# Patient Record
Sex: FEMALE | Race: WHITE | Employment: OTHER | URBAN - METROPOLITAN AREA
[De-identification: names, ages, dates, MRNs, and addresses within clinical notes are randomized per-mention and may not be internally consistent; named-entity substitution may affect disease eponyms.]

---

## 2017-01-10 DIAGNOSIS — E53.8 VITAMIN B12 DEFICIENCY: Primary | ICD-10-CM

## 2017-01-10 RX ORDER — PANTOPRAZOLE SODIUM 40 MG/1
40 TABLET, DELAYED RELEASE ORAL
COMMUNITY
Start: 2016-12-28 | End: 2018-01-10

## 2017-01-10 RX ORDER — CYANOCOBALAMIN 1000 UG/ML
1000 INJECTION INTRAMUSCULAR; SUBCUTANEOUS
Refills: 0 | COMMUNITY
Start: 2017-01-10 | End: 2018-07-27

## 2017-01-16 ENCOUNTER — NURSE ONLY (OUTPATIENT)
Dept: FAMILY MEDICINE CLINIC | Facility: CLINIC | Age: 67
End: 2017-01-16

## 2017-01-16 PROCEDURE — 96372 THER/PROPH/DIAG INJ SC/IM: CPT | Performed by: FAMILY MEDICINE

## 2017-01-16 RX ORDER — CYANOCOBALAMIN 1000 UG/ML
1000 INJECTION INTRAMUSCULAR; SUBCUTANEOUS ONCE
Status: COMPLETED | OUTPATIENT
Start: 2017-01-16 | End: 2017-01-16

## 2017-01-16 RX ADMIN — CYANOCOBALAMIN 1000 MCG: 1000 INJECTION INTRAMUSCULAR; SUBCUTANEOUS at 10:15:00

## 2017-01-17 ENCOUNTER — HOSPITAL ENCOUNTER (OUTPATIENT)
Dept: MRI IMAGING | Facility: HOSPITAL | Age: 67
Discharge: HOME | End: 2017-01-17
Attending: ORTHOPAEDIC SURGERY
Payer: MEDICARE

## 2017-01-17 DIAGNOSIS — M23.92 DERANGEMENT, KNEE INTERNAL, LEFT: ICD-10-CM

## 2017-01-17 PROCEDURE — 73721 MRI JNT OF LWR EXTRE W/O DYE: CPT

## 2017-01-24 PROBLEM — S83.242A ACUTE MEDIAL MENISCUS TEAR, LEFT, INITIAL ENCOUNTER: Status: ACTIVE | Noted: 2017-01-24

## 2017-01-24 PROBLEM — M17.12 PRIMARY OSTEOARTHRITIS OF LEFT KNEE: Status: ACTIVE | Noted: 2017-01-24

## 2017-01-27 ENCOUNTER — OFFICE VISIT (OUTPATIENT)
Dept: FAMILY MEDICINE CLINIC | Facility: CLINIC | Age: 67
End: 2017-01-27

## 2017-01-27 ENCOUNTER — APPOINTMENT (OUTPATIENT)
Dept: CT IMAGING | Facility: HOSPITAL | Age: 67
End: 2017-01-27
Attending: EMERGENCY MEDICINE
Payer: MEDICARE

## 2017-01-27 ENCOUNTER — HOSPITAL ENCOUNTER (EMERGENCY)
Facility: HOSPITAL | Age: 67
Discharge: HOME | End: 2017-01-27
Attending: EMERGENCY MEDICINE
Payer: MEDICARE

## 2017-01-27 VITALS
RESPIRATION RATE: 14 BRPM | DIASTOLIC BLOOD PRESSURE: 68 MMHG | HEART RATE: 76 BPM | BODY MASS INDEX: 28.56 KG/M2 | SYSTOLIC BLOOD PRESSURE: 130 MMHG | WEIGHT: 161.19 LBS | HEIGHT: 63 IN

## 2017-01-27 VITALS
DIASTOLIC BLOOD PRESSURE: 70 MMHG | BODY MASS INDEX: 27.29 KG/M2 | TEMPERATURE: 98 F | SYSTOLIC BLOOD PRESSURE: 148 MMHG | OXYGEN SATURATION: 98 % | HEART RATE: 62 BPM | RESPIRATION RATE: 18 BRPM | HEIGHT: 63 IN | WEIGHT: 154 LBS

## 2017-01-27 DIAGNOSIS — R40.20 LOC (LOSS OF CONSCIOUSNESS) (HCC): ICD-10-CM

## 2017-01-27 DIAGNOSIS — H53.9 VISION CHANGES: ICD-10-CM

## 2017-01-27 DIAGNOSIS — S00.83XA FACIAL BRUISING, INITIAL ENCOUNTER: ICD-10-CM

## 2017-01-27 DIAGNOSIS — S09.90XA HEAD INJURY, INITIAL ENCOUNTER: Primary | ICD-10-CM
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
## 2017-01-27 DIAGNOSIS — G44.311 INTRACTABLE ACUTE POST-TRAUMATIC HEADACHE: ICD-10-CM

## 2017-01-27 DIAGNOSIS — S00.83XA FACIAL CONTUSION, INITIAL ENCOUNTER: ICD-10-CM

## 2017-01-27 LAB
GLUCOSE BLD-MCNC: 61 MG/DL (ref 65–99)
GLUCOSE BLD-MCNC: 82 MG/DL (ref 65–99)

## 2017-01-27 PROCEDURE — 82962 GLUCOSE BLOOD TEST: CPT

## 2017-01-27 PROCEDURE — 99284 EMERGENCY DEPT VISIT MOD MDM: CPT

## 2017-01-27 PROCEDURE — 70450 CT HEAD/BRAIN W/O DYE: CPT

## 2017-01-27 PROCEDURE — 76377 3D RENDER W/INTRP POSTPROCES: CPT

## 2017-01-27 PROCEDURE — 70486 CT MAXILLOFACIAL W/O DYE: CPT

## 2017-01-27 RX ORDER — TRAMADOL HYDROCHLORIDE 50 MG/1
TABLET ORAL EVERY 4 HOURS PRN
Qty: 20 TABLET | Refills: 0 | Status: SHIPPED | OUTPATIENT
Start: 2017-01-27 | End: 2017-02-03

## 2017-01-27 RX ORDER — ERGOCALCIFEROL 1.25 MG/1
1 CAPSULE ORAL WEEKLY
Status: ON HOLD | COMMUNITY
Start: 2017-01-25 | End: 2017-02-28 | Stop reason: ALTCHOICE

## 2017-01-27 NOTE — ED PROVIDER NOTES
Patient Seen in: [facility] Emergency Department    History   Patient presents with:  Fall (musculoskeletal, neurologic)    Stated Complaint: fall/ head injury    HPI    71-year-old female complaining of head injury the patient tripped and fell 2 da sphincteroplasty     OTHER SURGICAL HISTORY  1996    Comment Exploratory laparotomy    OTHER SURGICAL HISTORY  1994    Comment partial gastrectomy/vagotomy    OTHER SURGICAL HISTORY  1996    Comment feeding tube    OTHER SURGICAL HISTORY      Comment bowel Take 1 tablet (300 mg total) by mouth once daily. Donepezil HCl 10 MG Oral Tab,  TAKE 1 TABLET BY MOUTH EVERY EVENING   lisinopril 10 MG Oral Tab,  Take 1 tablet (10 mg total) by mouth 2 (two) times daily.    TraMADol HCl 50 MG Oral Tab,  Take 1 tablet (5 other systems reviewed and negative except as noted above. PSFH elements reviewed from today and agreed except as otherwise stated in HPI.     Physical Exam       ED Triage Vitals   BP 01/27/17 1029 165/68 mmHg   Pulse 01/27/17 1029 70   Resp 01/27/17 10 Eun Solis, Mahendra [address] [phone]    In 3 days  As needed      Medications Prescribed:  Current Discharge Medication List

## 2017-01-27 NOTE — ED INITIAL ASSESSMENT (HPI)
Pt states on wed tripped while holding meat for the freezer pt states she did have loc she woke up on the floor  saw pcp today and was sent for CT per pt

## 2017-01-27 NOTE — PROGRESS NOTES
CC:  Patient presents with:  Fall: fell on grandson's toys on 1/25/17 - fell on left side- having headaches- blacked-out  Contusion (musculoskeletal)  Headache: taking Ibuprofen 600 mg BID- not helping      HPI: Abner Fraction presents with complaints of severe (8 - See telephone encounter on 08/12/2016 Disp:  Rfl: 0   Pilocarpine HCl 7.5 MG Oral Tab Take 1 tablet (7.5 mg total) by mouth 3 (three) times daily.  Disp: 90 tablet Rfl: 3   Blood Glucose Monitoring Suppl (TRUE METRIX AIR GLUCOSE METER) W/DEVICE Does not a Box Rfl: 3   DiltiaZEM HCl ER Beads (TAZTIA XT) 360 MG Oral Capsule SR 24 Hr TAKE 1 CAPSULE BY MOUTH ONCE DAILY Disp: 90 capsule Rfl: 1   mirtazapine 30 MG Oral Tab TAKE 1 TABLET BY MOUTH EVERY EVENING Disp: 90 tablet Rfl: 1   Atorvastatin Calcium 10 MG Or kg/(m^2). Vital signs reviewed.     Constitutional: Vital signs reviewed as noted; well developed, well nourished; in no acute distress  HENT:  Head: Bruising around the left eye with mild edema of the forehead and cheek area  Neurological: A&O x 3; norm

## 2017-01-30 ENCOUNTER — TELEPHONE (OUTPATIENT)
Dept: FAMILY MEDICINE CLINIC | Facility: CLINIC | Age: 67
End: 2017-01-30

## 2017-01-30 NOTE — TELEPHONE ENCOUNTER
Pt has made her appt for pre-Op w/Dr Stan Lacy on 1/31/17. She is having L Knee surgery w/Dr Lizz Ocasio on 2/10/17 (EKG and bld work requested. Shelly Master office to fax over orders)

## 2017-01-31 ENCOUNTER — APPOINTMENT (OUTPATIENT)
Dept: LAB | Age: 67
End: 2017-01-31
Attending: FAMILY MEDICINE
Payer: MEDICARE

## 2017-01-31 ENCOUNTER — OFFICE VISIT (OUTPATIENT)
Dept: FAMILY MEDICINE CLINIC | Facility: CLINIC | Age: 67
End: 2017-01-31

## 2017-01-31 VITALS
SYSTOLIC BLOOD PRESSURE: 136 MMHG | DIASTOLIC BLOOD PRESSURE: 60 MMHG | BODY MASS INDEX: 27.31 KG/M2 | WEIGHT: 160 LBS | RESPIRATION RATE: 16 BRPM | TEMPERATURE: 99 F | HEIGHT: 64 IN | HEART RATE: 60 BPM

## 2017-01-31 DIAGNOSIS — Z01.810 PREOP CARDIOVASCULAR EXAM: ICD-10-CM

## 2017-01-31 DIAGNOSIS — M35.00 SJOGREN'S SYNDROME (HCC): ICD-10-CM

## 2017-01-31 DIAGNOSIS — I10 ESSENTIAL HYPERTENSION: ICD-10-CM

## 2017-01-31 DIAGNOSIS — E78.00 PURE HYPERCHOLESTEROLEMIA: ICD-10-CM

## 2017-01-31 DIAGNOSIS — G47.00 INSOMNIA, UNSPECIFIED TYPE: ICD-10-CM

## 2017-01-31 DIAGNOSIS — E11.65 TYPE 2 DIABETES MELLITUS WITH HYPERGLYCEMIA, WITH LONG-TERM CURRENT USE OF INSULIN (HCC): ICD-10-CM

## 2017-01-31 DIAGNOSIS — Z01.818 PREOP TESTING: ICD-10-CM

## 2017-01-31 DIAGNOSIS — F03.90 DEMENTIA WITHOUT BEHAVIORAL DISTURBANCE, UNSPECIFIED DEMENTIA TYPE (HCC): ICD-10-CM

## 2017-01-31 DIAGNOSIS — Z79.4 TYPE 2 DIABETES MELLITUS WITH HYPERGLYCEMIA, WITH LONG-TERM CURRENT USE OF INSULIN (HCC): ICD-10-CM

## 2017-01-31 DIAGNOSIS — Z01.818 PREOP EXAMINATION: Primary | ICD-10-CM

## 2017-01-31 DIAGNOSIS — F31.9 BIPOLAR 1 DISORDER (HCC): ICD-10-CM

## 2017-01-31 DIAGNOSIS — L90.0 LICHEN SCLEROSUS: ICD-10-CM

## 2017-01-31 DIAGNOSIS — S83.242A ACUTE MEDIAL MENISCUS TEAR, LEFT, INITIAL ENCOUNTER: ICD-10-CM

## 2017-01-31 LAB
ALBUMIN SERPL-MCNC: 3.6 G/DL (ref 3.5–4.8)
ALP LIVER SERPL-CCNC: 133 U/L (ref 55–142)
ALT SERPL-CCNC: 48 U/L (ref 14–54)
AST SERPL-CCNC: 20 U/L (ref 15–41)
BILIRUB SERPL-MCNC: 0.2 MG/DL (ref 0.1–2)
BUN BLD-MCNC: 14 MG/DL (ref 8–20)
CALCIUM BLD-MCNC: 8.6 MG/DL (ref 8.3–10.3)
CHLORIDE: 106 MMOL/L (ref 101–111)
CO2: 25 MMOL/L (ref 22–32)
CREAT BLD-MCNC: 1.07 MG/DL (ref 0.55–1.02)
GLUCOSE BLD-MCNC: 299 MG/DL (ref 70–99)
M PROTEIN MFR SERPL ELPH: 6.9 G/DL (ref 6.1–8.3)
POTASSIUM SERPL-SCNC: 4.3 MMOL/L (ref 3.6–5.1)
SODIUM SERPL-SCNC: 136 MMOL/L (ref 136–144)

## 2017-01-31 PROCEDURE — 99214 OFFICE O/P EST MOD 30 MIN: CPT | Performed by: FAMILY MEDICINE

## 2017-01-31 PROCEDURE — 80053 COMPREHEN METABOLIC PANEL: CPT

## 2017-01-31 PROCEDURE — 36415 COLL VENOUS BLD VENIPUNCTURE: CPT

## 2017-01-31 PROCEDURE — 93000 ELECTROCARDIOGRAM COMPLETE: CPT | Performed by: FAMILY MEDICINE

## 2017-01-31 RX ORDER — CLOBETASOL PROPIONATE 0.5 MG/G
CREAM TOPICAL
Qty: 60 G | Refills: 3 | Status: SHIPPED | OUTPATIENT
Start: 2017-01-31

## 2017-01-31 NOTE — PROGRESS NOTES
Keila Muñoz is a 77year old female who presents for a pre-operative physical exam. Patient is to have left meniscal surgery, to be done by Dr. Bull Khoury at [facility] on 2/10/17. Pt has had previous anesthesia:  Yes.   Previous complicati ( mg total) by mouth every 4 (four) hours as needed for Pain.  Disp: 20 tablet Rfl: 0   Pantoprazole Sodium 40 MG Oral Tab EC  Disp:  Rfl:    cyanocobalamin 1000 MCG/ML Injection Solution Inject 1 mL (1,000 mcg total) into the muscle every 30 (thirty) Pen Needle (BD PEN NEEDLE MINI U/F) 31G X 5 MM Does not apply Misc Inject 1 pen into the skin daily.  Disp: 100 Box Rfl: 3   DiltiaZEM HCl ER Beads (TAZTIA XT) 360 MG Oral Capsule SR 24 Hr TAKE 1 CAPSULE BY MOUTH ONCE DAILY Disp: 90 capsule Rfl: 1   mirtaza Diverticulosis      uncomplicated   • Fecal incontinence 3/2012   • Urogenital disorder      atrophy   • Lichen sclerosus    • Asthma    • AR (allergic rhinitis) 3/5/2013   • Urticaria      trunk, extremities, face   • Headache in front of head 2/27/2013 headaches    EXAM:   /60 mmHg  Pulse 60  Temp(Src) 98.5 °F (36.9 °C) (Oral)  Resp 16  Ht 64\"  Wt 160 lb  BMI 27.45 kg/m2  GENERAL: well developed, well nourished,in no apparent distress  HEENT: atraumatic, normocephalic, O/P clear  NECK: supple,no a

## 2017-02-14 RX ORDER — ONDANSETRON 4 MG/1
TABLET, FILM COATED ORAL
Qty: 30 TABLET | Refills: 0 | Status: SHIPPED | OUTPATIENT
Start: 2017-02-14 | End: 2017-02-21

## 2017-02-14 NOTE — TELEPHONE ENCOUNTER
LOV 1/12/17.  Future Appointments  Date Time Provider Elvin Osman   2/17/2017 9:00 AM [facility]   2/20/2017 9:30 AM [facility] 03 NURSE [facility] 3 [facility]   3/16/2017 10:00 AM Rubia Barrera MD [facility] 3 [facility]   3/22/201

## 2017-02-17 PROBLEM — Z98.890 S/P LEFT KNEE ARTHROSCOPY: Status: ACTIVE | Noted: 2017-02-17

## 2017-02-21 ENCOUNTER — TELEPHONE (OUTPATIENT)
Dept: FAMILY MEDICINE CLINIC | Facility: CLINIC | Age: 67
End: 2017-02-21

## 2017-02-21 RX ORDER — ONDANSETRON 4 MG/1
4 TABLET, FILM COATED ORAL DAILY PRN
Qty: 90 TABLET | Refills: 0 | Status: SHIPPED | OUTPATIENT
Start: 2017-02-21

## 2017-02-21 RX ORDER — ONDANSETRON 4 MG/1
4 TABLET, FILM COATED ORAL DAILY PRN
Qty: 90 TABLET | Refills: 0 | Status: SHIPPED | OUTPATIENT
Start: 2017-02-21 | End: 2017-02-21

## 2017-02-21 NOTE — TELEPHONE ENCOUNTER
Medication sent to mail order. However, local pharmacy was cued up so that is where the prescription went first.  Please call local pharmacy and cancel that prescription.

## 2017-02-21 NOTE — TELEPHONE ENCOUNTER
Human mail order sent fax asking that Zofran script be revised and resent. The Sig did not say how many times Pt can take daily, and the quantity would have to be 90 day supply. Script changed and pended for approval. Is this order ok ?   Routed to Dr Darline Ott

## 2017-02-22 ENCOUNTER — NURSE ONLY (OUTPATIENT)
Dept: FAMILY MEDICINE CLINIC | Facility: CLINIC | Age: 67
End: 2017-02-22

## 2017-02-22 PROCEDURE — 96372 THER/PROPH/DIAG INJ SC/IM: CPT | Performed by: FAMILY MEDICINE

## 2017-02-22 RX ORDER — CYANOCOBALAMIN 1000 UG/ML
1000 INJECTION INTRAMUSCULAR; SUBCUTANEOUS ONCE
Status: COMPLETED | OUTPATIENT
Start: 2017-02-22 | End: 2017-02-22

## 2017-02-22 RX ADMIN — CYANOCOBALAMIN 1000 MCG: 1000 INJECTION INTRAMUSCULAR; SUBCUTANEOUS at 08:37:00

## 2017-02-22 NOTE — PROGRESS NOTES
Pt came in for monthly b12 injections. Pt was given 1000mcg in left arm IM. Pt responded well to injection, no reaction. Pt was told to schedule nurse visit in one month for next injection. Pt understood and verbalized agreement.

## 2017-02-27 ENCOUNTER — HOSPITAL ENCOUNTER (INPATIENT)
Facility: HOSPITAL | Age: 67
LOS: 2 days | Discharge: HOME | DRG: 390 | End: 2017-03-02
Attending: EMERGENCY MEDICINE | Admitting: HOSPITALIST
Payer: MEDICARE

## 2017-02-27 ENCOUNTER — APPOINTMENT (OUTPATIENT)
Dept: GENERAL RADIOLOGY | Facility: HOSPITAL | Age: 67
DRG: 390 | End: 2017-02-27
Attending: EMERGENCY MEDICINE
Payer: MEDICARE

## 2017-02-27 ENCOUNTER — APPOINTMENT (OUTPATIENT)
Dept: CT IMAGING | Facility: HOSPITAL | Age: 67
DRG: 390 | End: 2017-02-27
Attending: EMERGENCY MEDICINE
Payer: MEDICARE

## 2017-02-27 DIAGNOSIS — K56.609 SBO (SMALL BOWEL OBSTRUCTION) (HCC): Primary | ICD-10-CM

## 2017-02-27 LAB
ALBUMIN SERPL-MCNC: 3.3 G/DL (ref 3.5–4.8)
ALP LIVER SERPL-CCNC: 160 U/L (ref 55–142)
ALT SERPL-CCNC: 30 U/L (ref 14–54)
AST SERPL-CCNC: 15 U/L (ref 15–41)
BASOPHILS # BLD AUTO: 0.05 X10(3) UL (ref 0–0.1)
BASOPHILS NFR BLD AUTO: 0.5 %
BILIRUB SERPL-MCNC: 0.2 MG/DL (ref 0.1–2)
BILIRUB UR QL STRIP.AUTO: NEGATIVE
BUN BLD-MCNC: 11 MG/DL (ref 8–20)
CALCIUM BLD-MCNC: 9.1 MG/DL (ref 8.3–10.3)
CHLORIDE: 106 MMOL/L (ref 101–111)
CLARITY UR REFRACT.AUTO: CLEAR
CO2: 25 MMOL/L (ref 22–32)
COLOR UR AUTO: YELLOW
CREAT BLD-MCNC: 0.96 MG/DL (ref 0.55–1.02)
EOSINOPHIL # BLD AUTO: 0.31 X10(3) UL (ref 0–0.3)
EOSINOPHIL NFR BLD AUTO: 3.2 %
ERYTHROCYTE [DISTWIDTH] IN BLOOD BY AUTOMATED COUNT: 15.6 % (ref 11.5–16)
GLUCOSE BLD-MCNC: 295 MG/DL (ref 70–99)
GLUCOSE UR STRIP.AUTO-MCNC: >=500 MG/DL
HCT VFR BLD AUTO: 37.9 % (ref 34–50)
HGB BLD-MCNC: 12.7 G/DL (ref 12–16)
HYALINE CASTS #/AREA URNS AUTO: PRESENT /LPF
IMMATURE GRANULOCYTE COUNT: 0.03 X10(3) UL (ref 0–1)
IMMATURE GRANULOCYTE RATIO %: 0.3 %
KETONES UR STRIP.AUTO-MCNC: NEGATIVE MG/DL
LIPASE: 148 U/L (ref 73–393)
LYMPHOCYTES # BLD AUTO: 2.67 X10(3) UL (ref 0.9–4)
LYMPHOCYTES NFR BLD AUTO: 27.6 %
M PROTEIN MFR SERPL ELPH: 6.9 G/DL (ref 6.1–8.3)
MCH RBC QN AUTO: 27.1 PG (ref 27–33.2)
MCHC RBC AUTO-ENTMCNC: 33.5 G/DL (ref 31–37)
MCV RBC AUTO: 80.8 FL (ref 81–100)
MONOCYTES # BLD AUTO: 0.66 X10(3) UL (ref 0.1–0.6)
MONOCYTES NFR BLD AUTO: 6.8 %
NEUTROPHIL ABS PRELIM: 5.94 X10 (3) UL (ref 1.3–6.7)
NEUTROPHILS # BLD AUTO: 5.94 X10(3) UL (ref 1.3–6.7)
NEUTROPHILS NFR BLD AUTO: 61.6 %
NITRITE UR QL STRIP.AUTO: NEGATIVE
PH UR STRIP.AUTO: 5 [PH] (ref 4.5–8)
PLATELET # BLD AUTO: 310 10(3)UL (ref 150–450)
POTASSIUM SERPL-SCNC: 4.1 MMOL/L (ref 3.6–5.1)
PROT UR STRIP.AUTO-MCNC: NEGATIVE MG/DL
RBC # BLD AUTO: 4.69 X10(6)UL (ref 3.8–5.1)
RBC UR QL AUTO: NEGATIVE
RED CELL DISTRIBUTION WIDTH-SD: 44.6 FL (ref 35.1–46.3)
SODIUM SERPL-SCNC: 142 MMOL/L (ref 136–144)
SP GR UR STRIP.AUTO: 1.02 (ref 1–1.03)
UROBILINOGEN UR STRIP.AUTO-MCNC: <2 MG/DL
WBC # BLD AUTO: 9.7 X10(3) UL (ref 4–13)

## 2017-02-27 PROCEDURE — 71010 XR CHEST AP PORTABLE  (CPT=71010): CPT

## 2017-02-27 PROCEDURE — 74177 CT ABD & PELVIS W/CONTRAST: CPT

## 2017-02-27 RX ORDER — MAGNESIUM HYDROXIDE/ALUMINUM HYDROXICE/SIMETHICONE 120; 1200; 1200 MG/30ML; MG/30ML; MG/30ML
30 SUSPENSION ORAL ONCE
Status: COMPLETED | OUTPATIENT
Start: 2017-02-27 | End: 2017-02-27

## 2017-02-27 RX ORDER — ONDANSETRON 4 MG/1
TABLET, ORALLY DISINTEGRATING ORAL
Status: COMPLETED
Start: 2017-02-27 | End: 2017-02-27

## 2017-02-27 RX ORDER — ONDANSETRON 4 MG/1
4 TABLET, ORALLY DISINTEGRATING ORAL ONCE
Status: COMPLETED | OUTPATIENT
Start: 2017-02-27 | End: 2017-02-27

## 2017-02-28 PROBLEM — K56.609 SBO (SMALL BOWEL OBSTRUCTION) (HCC): Status: ACTIVE | Noted: 2017-02-28

## 2017-02-28 LAB
ATRIAL RATE: 83 BPM
EST. AVERAGE GLUCOSE BLD GHB EST-MCNC: 197 MG/DL (ref 68–126)
GLUCOSE BLD-MCNC: 117 MG/DL (ref 65–99)
GLUCOSE BLD-MCNC: 128 MG/DL (ref 65–99)
GLUCOSE BLD-MCNC: 189 MG/DL (ref 65–99)
GLUCOSE BLD-MCNC: 78 MG/DL (ref 65–99)
GLUCOSE BLD-MCNC: 99 MG/DL (ref 65–99)
HBA1C MFR BLD HPLC: 8.5 % (ref ?–5.7)
P AXIS: 65 DEGREES
P-R INTERVAL: 158 MS
Q-T INTERVAL: 386 MS
QRS DURATION: 88 MS
QTC CALCULATION (BEZET): 453 MS
R AXIS: -46 DEGREES
T AXIS: 25 DEGREES
VENTRICULAR RATE: 83 BPM

## 2017-02-28 PROCEDURE — 99223 1ST HOSP IP/OBS HIGH 75: CPT | Performed by: HOSPITALIST

## 2017-02-28 PROCEDURE — 99232 SBSQ HOSP IP/OBS MODERATE 35: CPT | Performed by: INTERNAL MEDICINE

## 2017-02-28 RX ORDER — ASCORBIC ACID 500 MG
500 TABLET ORAL DAILY
COMMUNITY

## 2017-02-28 RX ORDER — MORPHINE SULFATE 4 MG/ML
4 INJECTION, SOLUTION INTRAMUSCULAR; INTRAVENOUS EVERY 2 HOUR PRN
Status: DISCONTINUED | OUTPATIENT
Start: 2017-02-28 | End: 2017-03-02

## 2017-02-28 RX ORDER — SODIUM CHLORIDE 9 MG/ML
INJECTION, SOLUTION INTRAVENOUS CONTINUOUS
Status: ACTIVE | OUTPATIENT
Start: 2017-02-28 | End: 2017-02-28

## 2017-02-28 RX ORDER — ONDANSETRON 2 MG/ML
4 INJECTION INTRAMUSCULAR; INTRAVENOUS EVERY 6 HOURS PRN
Status: DISCONTINUED | OUTPATIENT
Start: 2017-02-28 | End: 2017-03-02

## 2017-02-28 RX ORDER — HYDROMORPHONE HYDROCHLORIDE 1 MG/ML
0.5 INJECTION, SOLUTION INTRAMUSCULAR; INTRAVENOUS; SUBCUTANEOUS ONCE
Status: COMPLETED | OUTPATIENT
Start: 2017-02-28 | End: 2017-02-28

## 2017-02-28 RX ORDER — MORPHINE SULFATE 4 MG/ML
INJECTION, SOLUTION INTRAMUSCULAR; INTRAVENOUS
Status: COMPLETED
Start: 2017-02-28 | End: 2017-02-28

## 2017-02-28 RX ORDER — SODIUM CHLORIDE 9 MG/ML
1000 INJECTION, SOLUTION INTRAVENOUS ONCE
Status: COMPLETED | OUTPATIENT
Start: 2017-02-28 | End: 2017-02-28

## 2017-02-28 RX ORDER — ENOXAPARIN SODIUM 100 MG/ML
40 INJECTION SUBCUTANEOUS DAILY
Status: DISCONTINUED | OUTPATIENT
Start: 2017-02-28 | End: 2017-03-02

## 2017-02-28 RX ORDER — DEXTROSE MONOHYDRATE 25 G/50ML
50 INJECTION, SOLUTION INTRAVENOUS
Status: DISCONTINUED | OUTPATIENT
Start: 2017-02-28 | End: 2017-03-02

## 2017-02-28 RX ORDER — DEXTROSE AND SODIUM CHLORIDE 5; .9 G/100ML; G/100ML
INJECTION, SOLUTION INTRAVENOUS CONTINUOUS
Status: DISCONTINUED | OUTPATIENT
Start: 2017-02-28 | End: 2017-03-02

## 2017-02-28 RX ORDER — DIPHENHYDRAMINE HYDROCHLORIDE 50 MG/ML
25 INJECTION INTRAMUSCULAR; INTRAVENOUS 2 TIMES DAILY PRN
Status: DISCONTINUED | OUTPATIENT
Start: 2017-02-28 | End: 2017-03-02

## 2017-02-28 RX ORDER — SODIUM CHLORIDE 9 MG/ML
INJECTION, SOLUTION INTRAVENOUS CONTINUOUS
Status: DISCONTINUED | OUTPATIENT
Start: 2017-02-28 | End: 2017-02-28

## 2017-02-28 RX ORDER — MORPHINE SULFATE 2 MG/ML
2 INJECTION, SOLUTION INTRAMUSCULAR; INTRAVENOUS EVERY 2 HOUR PRN
Status: DISCONTINUED | OUTPATIENT
Start: 2017-02-28 | End: 2017-03-02

## 2017-02-28 RX ORDER — MORPHINE SULFATE 2 MG/ML
1 INJECTION, SOLUTION INTRAMUSCULAR; INTRAVENOUS EVERY 2 HOUR PRN
Status: DISCONTINUED | OUTPATIENT
Start: 2017-02-28 | End: 2017-03-02

## 2017-02-28 NOTE — PROGRESS NOTES
Pt is A/O x 4. Room air, lungs clear. Bowel sounds active, most tender in upper epigastric region. Pt belching frequently. No BM this AM. Pain medication 1x on floor Morphine 4 mg which pt reports has resolved her pain.  Pruritis and restlessness- PRN IV Be

## 2017-02-28 NOTE — PROGRESS NOTES
02/28/17 0947   Clinical Encounter Type   Visited With Patient   Routine Visit Introduction   Continue Visiting Yes   Mandaen Encounters   Mandaen Needs Prayer    introduced self to the patient. Listened actively to the patient's journey.  En

## 2017-02-28 NOTE — ED PROVIDER NOTES
Patient Seen in: [facility] Emergency Department    History   Patient presents with:  Abdomen/Flank Pain (GI/)    Stated Complaint: abd pain    HPI    30-year-old female presents emergency room with chief complaint of abdominal pain.   Patient stat sphincteroplasty     OTHER SURGICAL HISTORY  1996    Comment Exploratory laparotomy    OTHER SURGICAL HISTORY  1994    Comment partial gastrectomy/vagotomy    OTHER SURGICAL HISTORY  1996    Comment feeding tube    OTHER SURGICAL HISTORY      Comment bowel mg total) by mouth once daily. BuPROPion HCl ER, XL, 300 MG Oral Tablet 24 Hr,  Take 1 tablet (300 mg total) by mouth once daily.    Donepezil HCl 10 MG Oral Tab,  TAKE 1 TABLET BY MOUTH EVERY EVENING   lisinopril 10 MG Oral Tab,  Take 1 tablet (10 mg tot Vitals   BP 02/27/17 2127 170/84 mmHg   Pulse 02/27/17 2127 91   Resp 02/27/17 2127 18   Temp 02/27/17 2127 97.9 °F (36.6 °C)   Temp src 02/27/17 2127 Temporal   SpO2 02/27/17 2127 98 %   O2 Device 02/27/17 2127 None (Room air)       Current:/65 mmHg ---------                               -----------         ------                     CBC W/ DIFFERENTIAL[[id number]]          Abnormal            Final result                 Please view results for these tests on the individual orders.

## 2017-02-28 NOTE — CONSULTS
[facility]  Report of Consultation    Nikolucio Johnson Patient Status:  Inpatient    1950 MRN [id number]   [facility] 3NE-A Attending Morenita Kellogg MD   [address] Day # 1 PCP Mabel Costa MD     Reason for Consultation:  Abdominal p obstruction [facility])    • Diverticulosis      uncomplicated   • Fecal incontinence 3/2012   • Urogenital disorder      atrophy   • Lichen sclerosus    • Asthma    • AR (allergic rhinitis) 3/5/2013   • Urticaria      trunk, extremities, face   • Headache in fr Intolerance       Metformin                   Comment:Abdominal cramp  Msg [Monosodium Glu*      Other                   Diarrhea    Comment:Raw sugar  Tylenol [Acetaminop*        Comment:Elevated liver enzymes    Medications:    Current facility-administe discharge and vision loss  Gastrointestinal: Positive for abdominal pain, nausea  Genitourinary:  Negative for dysuria and hematuria  Hema/Lymph:  Negative for easy bleeding and easy bruising  Integumentary:  Negative for pruritus and rash  Musculoskeletal 142   K  4.1   CL  106   CO2  25.0   ALKPHO  160*   AST  15   ALT  30   BILT  0.2   TP  6.9         No results for input(s): PTP, INR, PTT in the last 72 hours.     Imaging: (Dr. Joanie Orellana reviewed the report.)  Study Result      PROCEDURE: CT ABDOMEN PELVIS I calcifications. BONES:  No acute fractures.          =====  CONCLUSION:     #1. Distended loops of small bowel the small bowel fecalith spine. No definite transition point is identified.  The findings may represent a partial small bowel obstruction versus at this time, may resume p.o. medications once tolerating a diet  4.  I discussed with the patient and her companions that if she feels to improve with medical management she may require surgical intervention to relieve her small bowel obstruction, she does

## 2017-02-28 NOTE — PROGRESS NOTES
NURSING ADMISSION NOTE      Patient admitted via Cart  Oriented to room. Safety precautions initiated. Bed in low position. Call light in reach. A/O x 4. Completed admission navigator with patient. Dr Kelsea Rodriguez at bedside.  Surgery notified of consul

## 2017-02-28 NOTE — CM/SW NOTE
SW found pt thru casefinding for readmission risk and met with pt for assessment and d/c planning. Pt is a 79 y.o female admitted on 02/27 with SBO. Pt has no recent admissions per chart review.  Pt's medical history includes Bipolar, cervical CA, depressi

## 2017-02-28 NOTE — PLAN OF CARE
Diabetes/Glucose Control    • Glucose maintained within prescribed range Progressing        GASTROINTESTINAL - ADULT    • Minimal or absence of nausea and vomiting Progressing        PAIN - ADULT    • Verbalizes/displays adequate comfort level or patient's

## 2017-02-28 NOTE — PAYOR COMM NOTE
Attending Physician: Cleveland Sprague MD    Review Type: ADMISSION   Reviewer: Koko Puckett       Date: February 28, 2017 - 8:19 AM  Payor: Silvana Fletcher MEDICARE ADV HMO  Authorization Number: [id number]  Admit date: 2/27/2017  9:39 PM       REVIEWER COMMENTS  Chief C mg Intravenous Jordy Sequeira RN      ondansetron (ZOFRAN-ODT) disintegrating tab 4 mg     Date Action Dose Route User    2/27/2017 2142 Given 4 mg Oral Eileen Teague RN      0.9%  NaCl infusion 100 cc/hr    Date Action Dose Route User    2/28/2017 00 since yesterday. She has had no bowel movements. She has intermittent nausea.

## 2017-02-28 NOTE — PROGRESS NOTES
[facility]  Progress Note     Serena Mojica Patient Status:  Inpatient    1950 MRN [id number]   [facility] 3NE-A Attending Thony South MD   [facility] Day # 1 PCP Michael Conte MD     Chief Complaint: nausea    S: Patient repo adhesions from prior surgery. 2. Passing gas  3. Keep NPO  4. IVF, antiemetics, pain control  5. Surgery consult  2. DMII, 8.5%  1. hyperglycemia protocol  3. HTN, controlled  4. DL, statin on hold  5. GERD  6.  Depression/bipolar/insomnia, meds on hold fo

## 2017-02-28 NOTE — H&P
[facility]  History and Physical     Radha Louis Patient Status:  Inpatient    1950 MRN [id number]   [facility] 3NE-A Attending Nay Frank MD   Hosp Day # 1 PCP Matthew Jacobson MD     Chief Complaint: Abdominal pain (postoperative nausea and vomiting)    • Bipolar affective (HCC)    • Visual impairment      glasses        Past Surgical History:     Past Surgical History    KNEE REPLACEMENT SURGERY      APPENDECTOMY  1996    CHOLECYSTECTOMY      OTHER      Comment anal Rfl:    cyanocobalamin 1000 MCG/ML Injection Solution Inject 1 mL (1,000 mcg total) into the muscle every 30 (thirty) days.  Per Dr. Zonia Costa - See telephone encounter on 08/12/2016 Disp:  Rfl: 0   glimepiride 2 MG Oral Tab Take 1 tablet (2 mg total) by mouth each Rfl: 3   Cevimeline HCl 30 MG Oral Cap Take 1 capsule (30 mg total) by mouth 3 (three) times daily.  Failed pilocarpine 7.5mg TID Disp: 270 capsule Rfl: 3   Glucose Blood (TRUE METRIX BLOOD GLUCOSE TEST) In Vitro Strip TEST ONCE DAILY AS DIRECTED Disp: 9. 1   ALB  3.3*   NA  142   K  4.1   CL  106   CO2  25.0   ALKPHO  160*   AST  15   ALT  30   BILT  0.2   TP  6.9       Estimated Creatinine Clearance: 47 mL/min (based on Cr of 0.96). No results for input(s): PTP, INR in the last 72 hours.     No result

## 2017-03-01 ENCOUNTER — APPOINTMENT (OUTPATIENT)
Dept: GENERAL RADIOLOGY | Facility: HOSPITAL | Age: 67
DRG: 390 | End: 2017-03-01
Attending: HOSPITALIST
Payer: MEDICARE

## 2017-03-01 LAB
GLUCOSE BLD-MCNC: 126 MG/DL (ref 65–99)
GLUCOSE BLD-MCNC: 133 MG/DL (ref 65–99)
GLUCOSE BLD-MCNC: 147 MG/DL (ref 65–99)
GLUCOSE BLD-MCNC: 155 MG/DL (ref 65–99)
GLUCOSE BLD-MCNC: 176 MG/DL (ref 65–99)

## 2017-03-01 PROCEDURE — 99232 SBSQ HOSP IP/OBS MODERATE 35: CPT | Performed by: HOSPITALIST

## 2017-03-01 PROCEDURE — 74020 XR ABDOMEN, OBSTRUCTIVE SERIES (CPT=74020): CPT

## 2017-03-01 RX ORDER — LISINOPRIL 10 MG/1
10 TABLET ORAL 2 TIMES DAILY
Status: DISCONTINUED | OUTPATIENT
Start: 2017-03-01 | End: 2017-03-02

## 2017-03-01 RX ORDER — ESCITALOPRAM OXALATE 20 MG/1
20 TABLET ORAL NIGHTLY
Status: DISCONTINUED | OUTPATIENT
Start: 2017-03-01 | End: 2017-03-02

## 2017-03-01 RX ORDER — MIRTAZAPINE 15 MG/1
30 TABLET, FILM COATED ORAL NIGHTLY
Status: DISCONTINUED | OUTPATIENT
Start: 2017-03-01 | End: 2017-03-02

## 2017-03-01 NOTE — PROGRESS NOTES
03/01/17 1615   Clinical Encounter Type   Visited With Patient   Continue Visiting No   Patient's Supportive Strategies/Resources ( provided follow up visit.  Patient was at peace and encouraged, treatment having been successful, and expecting di

## 2017-03-01 NOTE — DISCHARGE SUMMARY
[facility]  DISCHARGE SUMMARY     Terence Johnson Patient Status:  Inpatient    1950 MRN [id number]   [facility] 3NE-A Attending Amanda Macdonald, [address] [facility] Day # 3 PCP Ronen Mendoza MD     Date of Admission: 2017  Date of Imaging c/w partial SOB vs. Ileus. She was admitted for further w/u and general surgery was consulted. She continued on conservative mgmt with antiemetics, IVF, pain mgmt. She did not require NG tube.   She slowly improved and diet was advanced as tolera tablet   Refills:  1       Insulin Glargine 100 UNIT/ML Sopn   Commonly known as:  LANTUS SOLOSTAR        Inject 10-30 Units into the skin nightly. As directed.     Quantity:  3 pen   Refills:  3       lisinopril 10 MG Tabs   Last time this was given:  10 m possible for a visit in 2 weeks      Jennifer Oden MD  [address] [facility] [phone]    In 1 week      Future Appointments  Date Time Provider Elvin Osman   3/7/2017 11:30 AM Jennifer Oden MD [facility] 3 [facility]   3/2

## 2017-03-01 NOTE — PLAN OF CARE
Diabetes/Glucose Control    • Glucose maintained within prescribed range Progressing        GASTROINTESTINAL - ADULT    • Minimal or absence of nausea and vomiting Progressing    • Maintains or returns to baseline bowel function Progressing    • Maintains

## 2017-03-01 NOTE — PROGRESS NOTES
[facility]  Progress Note    Demond Holding Patient Status:  Inpatient    1950 MRN [id number]   [facility] 3NE-A Attending Stephany Martinez, [address] [facility] Day # 2 PCP Rodri Joseph MD     Subjective:  No new complaints.  Awake and son Acute medial meniscus tear, left, initial encounter          Global exp 5-11-17 / LEFT KNEE / Clayton Carlisle      Pure hypercholesterolemia     Essential hypertension     S/P left knee arthroscopy     SBO (small bowel obstruction) (HCC)    Partial small bowel obstruc

## 2017-03-01 NOTE — PROGRESS NOTES
[facility]  Progress Note     [facility] Patient Status:  Inpatient    1950 MRN [id number]   [facility] 3NE-A Attending Genny Akbar, [address] [facility] Day # 2 PCP Megan Estrada MD     Chief Complaint: nausea    S: Patient repo gas/small BM  3. Diet -advance to soft diet when ok with surgery. 4. Reduce IVF, antiemetics, pain control  2. DMII, 8.5% uncontrolled  1. hyperglycemia protocol  3. HTN, controlled  4. DL, statin on hold  5. GERD  6.  Depression/bipolar/insomnia  1. meds

## 2017-03-01 NOTE — PLAN OF CARE
Patient reports she slept \"ok\". She is calm and cooperative with care. VSS. O2 sats wnl on RA. She states she had to get up for xray and everytime she gets up, she becomes nauseous. Zofran given for mild nausea.   She reports she had a small bm this a

## 2017-03-02 VITALS
HEIGHT: 63 IN | RESPIRATION RATE: 18 BRPM | HEART RATE: 70 BPM | DIASTOLIC BLOOD PRESSURE: 63 MMHG | OXYGEN SATURATION: 97 % | WEIGHT: 157 LBS | TEMPERATURE: 98 F | SYSTOLIC BLOOD PRESSURE: 139 MMHG | BODY MASS INDEX: 27.82 KG/M2

## 2017-03-02 LAB
GLUCOSE BLD-MCNC: 143 MG/DL (ref 65–99)
GLUCOSE BLD-MCNC: 88 MG/DL (ref 65–99)

## 2017-03-02 PROCEDURE — 99239 HOSP IP/OBS DSCHRG MGMT >30: CPT | Performed by: HOSPITALIST

## 2017-03-02 NOTE — PROGRESS NOTES
[facility]  Progress Note     Eliseo Henriquez Patient Status:  Inpatient    1950 MRN [id number]   [facility] 3NE-A Attending Jamar Covington, [address] [facility] Day # 3 PCP Lianet Aguila MD     Chief Complaint: nausea    S: Patient repo abdominal distention, resolved  1. Suspect adhesions from prior surgery. 2. Passing stool, no further nausea and distention resolved  3. Advance diet. 4. stop IVF, antiemetics, pain control  2. DMII, 8.5% uncontrolled  1. hyperglycemia protocol  3.  HTN,

## 2017-03-02 NOTE — PROGRESS NOTES
[facility]  Progress Note    Willy Riser Patient Status:  Inpatient    1950 MRN [id number]   [facility] 3NE-A Attending Clint Zepeda, [address] [facility] Day # 3 PCP Tara Chicas MD     Subjective:  No new complaints.  Awake and son meniscus tear, left, initial encounter          Global exp 5-11-17 / LEFT KNEE / Sangeetha Ray      Pure hypercholesterolemia     Essential hypertension     S/P left knee arthroscopy     SBO (small bowel obstruction) (HCC)    Partial small bowel obstruction, resolve

## 2017-03-02 NOTE — CM/SW NOTE
03/02/17 1400   Discharge disposition   Discharged to: Home or [address] [place] [address] services after discharge Patient refused services   Discharge transportation Private car

## 2017-03-07 ENCOUNTER — OFFICE VISIT (OUTPATIENT)
Dept: FAMILY MEDICINE CLINIC | Facility: CLINIC | Age: 67
End: 2017-03-07

## 2017-03-07 VITALS
SYSTOLIC BLOOD PRESSURE: 136 MMHG | DIASTOLIC BLOOD PRESSURE: 66 MMHG | TEMPERATURE: 98 F | BODY MASS INDEX: 28 KG/M2 | HEART RATE: 84 BPM | WEIGHT: 158 LBS | RESPIRATION RATE: 16 BRPM

## 2017-03-07 DIAGNOSIS — R82.90 ABNORMAL URINALYSIS: ICD-10-CM

## 2017-03-07 DIAGNOSIS — E11.65 TYPE 2 DIABETES MELLITUS WITH HYPERGLYCEMIA, WITH LONG-TERM CURRENT USE OF INSULIN (HCC): ICD-10-CM

## 2017-03-07 DIAGNOSIS — K56.609 SBO (SMALL BOWEL OBSTRUCTION) (HCC): Primary | ICD-10-CM

## 2017-03-07 DIAGNOSIS — E78.00 PURE HYPERCHOLESTEROLEMIA: ICD-10-CM

## 2017-03-07 DIAGNOSIS — Z79.4 TYPE 2 DIABETES MELLITUS WITH HYPERGLYCEMIA, WITH LONG-TERM CURRENT USE OF INSULIN (HCC): ICD-10-CM

## 2017-03-07 LAB
BILIRUBIN: NEGATIVE
GLUCOSE (URINE DIPSTICK): NEGATIVE MG/DL
KETONES (URINE DIPSTICK): NEGATIVE MG/DL
LEUKOCYTES: NEGATIVE
MULTISTIX LOT#: ABNORMAL NUMERIC
NITRITE, URINE: NEGATIVE
OCCULT BLOOD: NEGATIVE
PH, URINE: 5.5 (ref 4.5–8)
SPECIFIC GRAVITY: 1.03 (ref 1–1.03)

## 2017-03-07 PROCEDURE — 87086 URINE CULTURE/COLONY COUNT: CPT | Performed by: FAMILY MEDICINE

## 2017-03-07 PROCEDURE — 81003 URINALYSIS AUTO W/O SCOPE: CPT | Performed by: FAMILY MEDICINE

## 2017-03-07 PROCEDURE — 99214 OFFICE O/P EST MOD 30 MIN: CPT | Performed by: FAMILY MEDICINE

## 2017-03-08 NOTE — PROGRESS NOTES
HPI:    Yael Douglas is a 79year old female here today for hospital follow up.    She was discharged from Inpatient hospital, [facility] to Home   Admit Date: 2/27/17  Discharge Date: 3/2/17  Hospital Discharge Diagnosis:    SBO    HPI: admitted Donepezil HCl 10 MG Oral Tab TAKE 1 TABLET BY MOUTH EVERY EVENING   lisinopril 10 MG Oral Tab Take 1 tablet (10 mg total) by mouth 2 (two) times daily.    DiltiaZEM HCl ER Beads (TAZTIA XT) 360 MG Oral Capsule SR 24 Hr TAKE 1 CAPSULE BY MOUTH ONCE DAILY history includes CAD in her mother; CAD,HTN in her father; Cancer in her father and another family member; Diabetes in her brother; Heart Disease in her mother; Heart Disorder (age of onset: 80) in her mother; Stroke in her mother.    She  reports that she check.  A1c prior to visit. 4.  Hypercholesterolemia  Lipids due before next visit.     F/u in ~8 weeks    Orders Placed This Encounter  Urine Dip, auto without Micro  Urine Culture, Routine [E]    Meds & Refills for this Visit:  No prescriptions request

## 2017-03-13 ENCOUNTER — TELEPHONE (OUTPATIENT)
Dept: FAMILY MEDICINE CLINIC | Facility: CLINIC | Age: 67
End: 2017-03-13

## 2017-03-16 RX ORDER — GLIMEPIRIDE 2 MG/1
TABLET ORAL
Qty: 180 TABLET | Refills: 1 | Status: SHIPPED | OUTPATIENT
Start: 2017-03-16 | End: 2017-07-13

## 2017-03-16 RX ORDER — ATORVASTATIN CALCIUM 10 MG/1
TABLET, FILM COATED ORAL
Qty: 90 TABLET | Refills: 1 | Status: SHIPPED | OUTPATIENT
Start: 2017-03-16 | End: 2017-07-13

## 2017-03-16 RX ORDER — MIRTAZAPINE 30 MG/1
TABLET, FILM COATED ORAL
Qty: 90 TABLET | Refills: 1 | Status: SHIPPED | OUTPATIENT
Start: 2017-03-16 | End: 2017-07-13

## 2017-03-16 RX ORDER — DILTIAZEM HYDROCHLORIDE 360 MG/1
CAPSULE, EXTENDED RELEASE ORAL
Qty: 90 CAPSULE | Refills: 1 | Status: SHIPPED | OUTPATIENT
Start: 2017-03-16 | End: 2017-06-21

## 2017-03-16 RX ORDER — CITALOPRAM 40 MG/1
TABLET ORAL
Qty: 90 TABLET | Refills: 1 | Status: SHIPPED | OUTPATIENT
Start: 2017-03-16 | End: 2017-07-13

## 2017-03-16 RX ORDER — DONEPEZIL HYDROCHLORIDE 10 MG/1
TABLET, FILM COATED ORAL
Qty: 90 TABLET | Refills: 1 | Status: SHIPPED | OUTPATIENT
Start: 2017-03-16 | End: 2017-07-13

## 2017-03-16 RX ORDER — ISOPROPYL ALCOHOL 70 ML/100ML
SWAB TOPICAL
Qty: 600 EACH | Refills: 1 | Status: SHIPPED | OUTPATIENT
Start: 2017-03-16 | End: 2018-02-10

## 2017-03-16 RX ORDER — LISINOPRIL 10 MG/1
TABLET ORAL
Qty: 180 TABLET | Refills: 1 | Status: SHIPPED | OUTPATIENT
Start: 2017-03-16 | End: 2017-07-13

## 2017-03-16 NOTE — TELEPHONE ENCOUNTER
Refills requested for Citalopram, Aricept, and Remeron. LOV 03/07/17 and labs 02/27/17. Will you approve ? Routed to Dr Reilly Mederos.

## 2017-03-17 DIAGNOSIS — E78.00 HYPERCHOLESTEROLEMIA: Primary | ICD-10-CM

## 2017-03-17 RX ORDER — RANITIDINE 300 MG/1
CAPSULE ORAL
Qty: 90 CAPSULE | Refills: 0 | Status: SHIPPED | OUTPATIENT
Start: 2017-03-17 | End: 2017-09-02

## 2017-03-22 ENCOUNTER — NURSE ONLY (OUTPATIENT)
Dept: FAMILY MEDICINE CLINIC | Facility: CLINIC | Age: 67
End: 2017-03-22

## 2017-03-22 DIAGNOSIS — E53.8 B12 DEFICIENCY: Primary | ICD-10-CM

## 2017-03-22 PROCEDURE — 96372 THER/PROPH/DIAG INJ SC/IM: CPT | Performed by: FAMILY MEDICINE

## 2017-03-22 RX ORDER — CYANOCOBALAMIN 1000 UG/ML
1000 INJECTION INTRAMUSCULAR; SUBCUTANEOUS ONCE
Status: COMPLETED | OUTPATIENT
Start: 2017-03-22 | End: 2017-03-22

## 2017-03-22 RX ADMIN — CYANOCOBALAMIN 1000 MCG: 1000 INJECTION INTRAMUSCULAR; SUBCUTANEOUS at 09:37:00

## 2017-03-22 NOTE — PROGRESS NOTES
Pt received vitamin B-12 injection today, pt handled it well, advise to make appointment for next injection.

## 2017-04-05 ENCOUNTER — OFFICE VISIT (OUTPATIENT)
Dept: SURGERY | Facility: CLINIC | Age: 67
End: 2017-04-05

## 2017-04-05 VITALS
DIASTOLIC BLOOD PRESSURE: 77 MMHG | WEIGHT: 158 LBS | HEART RATE: 60 BPM | BODY MASS INDEX: 26.98 KG/M2 | SYSTOLIC BLOOD PRESSURE: 136 MMHG | TEMPERATURE: 98 F | RESPIRATION RATE: 14 BRPM | HEIGHT: 64 IN

## 2017-04-05 DIAGNOSIS — K56.609 SBO (SMALL BOWEL OBSTRUCTION) (HCC): Primary | ICD-10-CM

## 2017-04-05 DIAGNOSIS — K44.9 HIATAL HERNIA: ICD-10-CM

## 2017-04-05 PROCEDURE — 99213 OFFICE O/P EST LOW 20 MIN: CPT | Performed by: SURGERY

## 2017-04-05 RX ORDER — PEN NEEDLE, DIABETIC 31 GX5/16"
NEEDLE, DISPOSABLE MISCELLANEOUS
COMMUNITY
Start: 2017-03-30 | End: 2017-07-13

## 2017-04-05 NOTE — PROGRESS NOTES
HPI:    Patient ID: Demond Slaughter is a 79year old female who follows up after being admitted to [facility] on February 27, 2017 with a small bowel obstruction. The patient's bowel obstruction was able to be resolved with conservative means.   She Ondansetron HCl (ZOFRAN) 4 mg tablet Take 1 tablet (4 mg total) by mouth daily as needed for Nausea.  Disp: 90 tablet Rfl: 0   Clobetasol Propionate 0.05 % External Cream Use twice daily to affected area Disp: 60 g Rfl: 3   Pantoprazole Sodium 40 MG Oral rigidity, no rebound, no guarding, no CVA tenderness, no tenderness at McBurney's point and negative Martinez's sign. No hernia. Hernia confirmed negative in the ventral area.    Well-healed scars   Neurological: She is alert and oriented to person, place, an

## 2017-04-26 ENCOUNTER — NURSE ONLY (OUTPATIENT)
Dept: FAMILY MEDICINE CLINIC | Facility: CLINIC | Age: 67
End: 2017-04-26

## 2017-04-27 PROCEDURE — 96372 THER/PROPH/DIAG INJ SC/IM: CPT | Performed by: FAMILY MEDICINE

## 2017-04-27 RX ORDER — CYANOCOBALAMIN 1000 UG/ML
1000 INJECTION INTRAMUSCULAR; SUBCUTANEOUS ONCE
Status: COMPLETED | OUTPATIENT
Start: 2017-04-27 | End: 2017-04-27

## 2017-04-27 RX ADMIN — CYANOCOBALAMIN 1000 MCG: 1000 INJECTION INTRAMUSCULAR; SUBCUTANEOUS at 10:05:00

## 2017-04-27 NOTE — PROGRESS NOTES
Pt came in for monthly vit B12 injection. Pt was administered 1000 mcg in LA intramuscular. Pt responded well to injection no reaction.

## 2017-05-05 ENCOUNTER — TELEPHONE (OUTPATIENT)
Dept: FAMILY MEDICINE CLINIC | Facility: CLINIC | Age: 67
End: 2017-05-05

## 2017-05-05 ENCOUNTER — OFFICE VISIT (OUTPATIENT)
Dept: FAMILY MEDICINE CLINIC | Facility: CLINIC | Age: 67
End: 2017-05-05

## 2017-05-05 VITALS
HEART RATE: 80 BPM | RESPIRATION RATE: 16 BRPM | WEIGHT: 157 LBS | DIASTOLIC BLOOD PRESSURE: 80 MMHG | TEMPERATURE: 99 F | SYSTOLIC BLOOD PRESSURE: 140 MMHG | HEIGHT: 63 IN | BODY MASS INDEX: 27.82 KG/M2

## 2017-05-05 DIAGNOSIS — R05.9 COUGH: Primary | ICD-10-CM

## 2017-05-05 PROCEDURE — 99213 OFFICE O/P EST LOW 20 MIN: CPT | Performed by: FAMILY MEDICINE

## 2017-05-05 RX ORDER — BENZONATATE 200 MG/1
200 CAPSULE ORAL 3 TIMES DAILY PRN
Qty: 20 CAPSULE | Refills: 0 | Status: SHIPPED | OUTPATIENT
Start: 2017-05-05 | End: 2018-06-10 | Stop reason: ALTCHOICE

## 2017-05-05 RX ORDER — AZITHROMYCIN 250 MG/1
TABLET, FILM COATED ORAL
Qty: 6 TABLET | Refills: 0 | Status: SHIPPED | OUTPATIENT
Start: 2017-05-05 | End: 2017-05-11 | Stop reason: ALTCHOICE

## 2017-05-05 NOTE — TELEPHONE ENCOUNTER
RADHA for patient to return our call to complete her Annual Health Assessment Form prior to her appt.  5/11/17 with Dr. Becky Tanner

## 2017-05-05 NOTE — PROGRESS NOTES
HPI:   Demond Slaughter is a 79year old female who presents for upper respiratory symptoms. Symptoms include:  Cough with phlegm. Fever up to 102. Symptoms have been present for  4  days.   Over the counter medications tried:  Ibuprofen - causing stomac twice daily to affected area Disp: 60 g Rfl: 3   Pantoprazole Sodium 40 MG Oral Tab EC Take 40 mg by mouth 2 (two) times daily before meals.    Disp:  Rfl:    cyanocobalamin 1000 MCG/ML Injection Solution Inject 1 mL (1,000 mcg total) into the muscle every daily.      benzonatate 200 MG Oral Cap 20 capsule 0      Sig: Take 1 capsule (200 mg total) by mouth 3 (three) times daily as needed for cough.          Imaging & Consults:  None

## 2017-05-11 ENCOUNTER — OFFICE VISIT (OUTPATIENT)
Dept: FAMILY MEDICINE CLINIC | Facility: CLINIC | Age: 67
End: 2017-05-11

## 2017-05-11 VITALS
SYSTOLIC BLOOD PRESSURE: 136 MMHG | HEART RATE: 84 BPM | DIASTOLIC BLOOD PRESSURE: 60 MMHG | TEMPERATURE: 98 F | RESPIRATION RATE: 16 BRPM

## 2017-05-11 DIAGNOSIS — E53.8 B12 DEFICIENCY: ICD-10-CM

## 2017-05-11 DIAGNOSIS — F33.42 RECURRENT MAJOR DEPRESSIVE DISORDER, IN FULL REMISSION (HCC): ICD-10-CM

## 2017-05-11 DIAGNOSIS — G47.00 INSOMNIA, UNSPECIFIED TYPE: ICD-10-CM

## 2017-05-11 DIAGNOSIS — M35.00 SJOGREN'S SYNDROME, WITH UNSPECIFIED ORGAN INVOLVEMENT (HCC): ICD-10-CM

## 2017-05-11 DIAGNOSIS — K57.30 DIVERTICULOSIS OF LARGE INTESTINE WITHOUT HEMORRHAGE: ICD-10-CM

## 2017-05-11 DIAGNOSIS — L90.0 LICHEN SCLEROSUS: ICD-10-CM

## 2017-05-11 DIAGNOSIS — Z00.00 ENCOUNTER FOR ANNUAL HEALTH EXAMINATION: Primary | ICD-10-CM

## 2017-05-11 DIAGNOSIS — I10 ESSENTIAL HYPERTENSION: ICD-10-CM

## 2017-05-11 DIAGNOSIS — Z23 NEED FOR VACCINATION: ICD-10-CM

## 2017-05-11 DIAGNOSIS — E11.69 HYPERLIPIDEMIA ASSOCIATED WITH TYPE 2 DIABETES MELLITUS (HCC): ICD-10-CM

## 2017-05-11 DIAGNOSIS — E11.65 TYPE 2 DIABETES MELLITUS WITH HYPERGLYCEMIA, WITH LONG-TERM CURRENT USE OF INSULIN (HCC): ICD-10-CM

## 2017-05-11 DIAGNOSIS — F31.9 BIPOLAR 1 DISORDER (HCC): ICD-10-CM

## 2017-05-11 DIAGNOSIS — K21.9 GASTROESOPHAGEAL REFLUX DISEASE WITHOUT ESOPHAGITIS: ICD-10-CM

## 2017-05-11 DIAGNOSIS — Z98.890 S/P LEFT KNEE ARTHROSCOPY: ICD-10-CM

## 2017-05-11 DIAGNOSIS — K58.0 IRRITABLE BOWEL SYNDROME WITH DIARRHEA: ICD-10-CM

## 2017-05-11 DIAGNOSIS — E78.5 HYPERLIPIDEMIA ASSOCIATED WITH TYPE 2 DIABETES MELLITUS (HCC): ICD-10-CM

## 2017-05-11 DIAGNOSIS — Z79.4 TYPE 2 DIABETES MELLITUS WITH HYPERGLYCEMIA, WITH LONG-TERM CURRENT USE OF INSULIN (HCC): ICD-10-CM

## 2017-05-11 DIAGNOSIS — F03.90 DEMENTIA WITHOUT BEHAVIORAL DISTURBANCE, UNSPECIFIED DEMENTIA TYPE (HCC): ICD-10-CM

## 2017-05-11 DIAGNOSIS — Z11.59 NEED FOR HEPATITIS C SCREENING TEST: ICD-10-CM

## 2017-05-11 PROBLEM — E78.00 PURE HYPERCHOLESTEROLEMIA: Status: RESOLVED | Noted: 2017-01-31 | Resolved: 2017-05-11

## 2017-05-11 PROBLEM — M17.12 PRIMARY OSTEOARTHRITIS OF LEFT KNEE: Status: RESOLVED | Noted: 2017-01-24 | Resolved: 2017-05-11

## 2017-05-11 PROBLEM — S83.242A ACUTE MEDIAL MENISCUS TEAR, LEFT, INITIAL ENCOUNTER: Status: RESOLVED | Noted: 2017-01-24 | Resolved: 2017-05-11

## 2017-05-11 PROBLEM — K56.609 SBO (SMALL BOWEL OBSTRUCTION) (HCC): Status: RESOLVED | Noted: 2017-02-28 | Resolved: 2017-05-11

## 2017-05-11 PROCEDURE — 96160 PT-FOCUSED HLTH RISK ASSMT: CPT | Performed by: FAMILY MEDICINE

## 2017-05-11 PROCEDURE — 90732 PPSV23 VACC 2 YRS+ SUBQ/IM: CPT | Performed by: FAMILY MEDICINE

## 2017-05-11 PROCEDURE — G0009 ADMIN PNEUMOCOCCAL VACCINE: HCPCS | Performed by: FAMILY MEDICINE

## 2017-05-11 RX ORDER — PILOCARPINE HYDROCHLORIDE 7.5 MG/1
7.5 TABLET, FILM COATED ORAL 3 TIMES DAILY
Qty: 90 TABLET | Refills: 1 | Status: SHIPPED | OUTPATIENT
Start: 2017-05-11 | End: 2019-01-24

## 2017-05-11 RX ORDER — TRAMADOL HYDROCHLORIDE 50 MG/1
TABLET ORAL
COMMUNITY
Start: 2017-04-21 | End: 2018-03-20

## 2017-05-11 RX ORDER — DIAZEPAM 5 MG/1
5 TABLET ORAL DAILY PRN
Qty: 10 TABLET | Refills: 0 | Status: SHIPPED | OUTPATIENT
Start: 2017-05-11 | End: 2017-09-08

## 2017-05-11 RX ORDER — CALCIUM CITRATE/VITAMIN D3 200MG-6.25
TABLET ORAL
COMMUNITY
Start: 2017-04-21 | End: 2017-10-26

## 2017-05-11 NOTE — PATIENT INSTRUCTIONS
Have mammogram done. Have Lipid panel done before you leave for [place]. Increase Lantus 2 units every 3 days if fasting sugar is >120.   Track your blood sugars daily        Luiza Sol Barker's SCREENING SCHEDULE   Tests on this list are recommended by you IPPE only No results found for this or any previous visit.  Limited to patients who meet one of the following criteria:   • Men who are 73-68 years old and have smoked more than 100 cigarettes in their lifetime   • Anyone with a family history    Colorectal 74, and as needed after 75 Mammogram,1 Yr due on 02/19/2015 Please get this Mammogram regularly   Immunizations      Influenza  Covered Annually No orders found for this or any previous visit.  Please get every year    Pneumococcal 13 (Prevnar)  Covered Onc their home computer and printer. (the forms are also available in [address])  [email or web address]  This link also has information from the [facility] regarding Advance Directives.

## 2017-05-11 NOTE — PROGRESS NOTES
HPI:   Josue Bhardwaj is a 79year old female who presents for a MA (Medicare Advantage) Supervisit (Once per calendar year). DM dx at age 54.   Current medication:  Lantus (started 8/2016) Taking 30 units at night and Glimepiride 2mg BID (increased Patient Care Team: Patient Care Team:  Lex Peralta MD as PCP - General (Family Practice)  Radha Hernandez (Physician Assistant)    Patient Active Problem List:     Diverticulosis     Sjogren's syndrome [facility])     IBS (irritable bowel syndro Tab Take 1 tablet (5 mg total) by mouth daily as needed for Anxiety. benzonatate 200 MG Oral Cap Take 1 capsule (200 mg total) by mouth 3 (three) times daily as needed for cough.    BD PEN NEEDLE MINI U/F 31G X 5 MM Does not apply Misc    RANITIDINE HCL 3 Hyperlipidemia; Small bowel obstruction ([facility] [address] Diverticulosis; Fecal incontinence (3/2012); Urogenital disorder; Lichen sclerosus; Asthma; AR (allergic rhinitis) (3/5/2013); Urticaria; Headache in front of head (2/27/2013); Elevated LFTs;  Arthritis (cervic 98.4 °F (36.9 °C) (Oral)  Resp 16 Estimated body mass index is 27.82 kg/(m^2) as calculated from the following:    Height as of 17: 63\". Weight as of 17: 157 lb.     Medicare Hearing Assessment  (Required for AWV/SWV)    Hearing Screening    Murrel Apgar Sjogren's syndrome, with unspecified organ involvement ([facility] [address]  Sees rheum    6. Bipolar 1 disorder (HCC)  BPM, stable    7. Dementia without behavioral disturbance, unspecified dementia type  Continue aricept    8.  Insomnia, unspecified type  remeron at . Mary Flores Puzzles;Games    If you are a male age 38-65 or a female age 47-67, do you take aspirin?: No    Have you had any immunizations at another office such as Influenza, Hepatitis B, Tetanus, or Pneumococcal?: No     Functional Ability     Bathing or Showering: yourself or your family down: Not at all    Trouble concentrating on things, such as reading the newspaper or watching television: Several days    Moving or speaking so slowly that other people could have noticed.  Or the opposite - being so fidgety or rest applicable    Flex Sigmoidoscopy Screen every 10 years No results found for this or any previous visit. No flowsheet data found. Fecal Occult Blood Annually No results found for: FOB No flowsheet data found.     Glaucoma Screening      Ophthalmology [facility] history found This may be covered with your pharmacy  prescription benefits        SPECIFIC DISEASE MONITORING Internal Lab or Procedure External Lab or Procedure   Annual Monitoring of Persistent     Medications (ACE/ARB, digoxin diuretics, anticonvulsant

## 2017-05-16 ENCOUNTER — APPOINTMENT (OUTPATIENT)
Dept: LAB | Age: 67
End: 2017-05-16
Attending: FAMILY MEDICINE
Payer: MEDICARE

## 2017-05-16 DIAGNOSIS — E78.00 PURE HYPERCHOLESTEROLEMIA: ICD-10-CM

## 2017-05-16 DIAGNOSIS — Z11.59 NEED FOR HEPATITIS C SCREENING TEST: ICD-10-CM

## 2017-05-16 PROCEDURE — 36415 COLL VENOUS BLD VENIPUNCTURE: CPT | Performed by: FAMILY MEDICINE

## 2017-05-31 RX ORDER — BUPROPION HYDROCHLORIDE 300 MG/1
TABLET ORAL
Qty: 90 TABLET | Refills: 1 | Status: SHIPPED | OUTPATIENT
Start: 2017-05-31 | End: 2018-01-19

## 2017-05-31 NOTE — TELEPHONE ENCOUNTER
Refill for Wellbutrin approved per protocol. LOV 05/11/17 and labs 05/16/17. Will you approve refill ? Routed to Dr Latoya Clark.

## 2017-06-21 DIAGNOSIS — I10 ESSENTIAL HYPERTENSION: Primary | ICD-10-CM

## 2017-06-27 RX ORDER — DILTIAZEM HYDROCHLORIDE 360 MG/1
CAPSULE, EXTENDED RELEASE ORAL
Qty: 90 CAPSULE | Refills: 1 | Status: SHIPPED | OUTPATIENT
Start: 2017-06-27 | End: 2018-01-19

## 2017-07-11 ENCOUNTER — PATIENT OUTREACH (OUTPATIENT)
Dept: CASE MANAGEMENT | Age: 67
End: 2017-07-11

## 2017-07-13 ENCOUNTER — PATIENT OUTREACH (OUTPATIENT)
Dept: CASE MANAGEMENT | Age: 67
End: 2017-07-13

## 2017-07-13 RX ORDER — GLIMEPIRIDE 2 MG/1
TABLET ORAL
Qty: 180 TABLET | Refills: 1 | Status: SHIPPED | OUTPATIENT
Start: 2017-07-13 | End: 2018-01-19

## 2017-07-13 RX ORDER — PEN NEEDLE, DIABETIC 31 GX5/16"
NEEDLE, DISPOSABLE MISCELLANEOUS
Qty: 90 EACH | Refills: 3 | Status: SHIPPED | OUTPATIENT
Start: 2017-07-13 | End: 2019-04-16

## 2017-07-13 RX ORDER — DONEPEZIL HYDROCHLORIDE 10 MG/1
TABLET, FILM COATED ORAL
Qty: 90 TABLET | Refills: 1 | Status: SHIPPED | OUTPATIENT
Start: 2017-07-13 | End: 2018-01-19

## 2017-07-13 RX ORDER — ATORVASTATIN CALCIUM 10 MG/1
TABLET, FILM COATED ORAL
Qty: 90 TABLET | Refills: 1 | Status: SHIPPED | OUTPATIENT
Start: 2017-07-13 | End: 2018-01-19

## 2017-07-13 RX ORDER — MIRTAZAPINE 30 MG/1
TABLET, FILM COATED ORAL
Qty: 90 TABLET | Refills: 1 | Status: SHIPPED | OUTPATIENT
Start: 2017-07-13 | End: 2018-01-19

## 2017-07-13 RX ORDER — LISINOPRIL 10 MG/1
TABLET ORAL
Qty: 180 TABLET | Refills: 1 | Status: SHIPPED | OUTPATIENT
Start: 2017-07-13 | End: 2018-01-19

## 2017-07-13 NOTE — PROGRESS NOTES
Attempted to contact pt to introduce myself as CCM and go over assessment with pt, no answer left detailed message for pt to call back. Also notified I would send my info in the mail and try again in couple days.      Total time spent with communication and

## 2017-07-13 NOTE — TELEPHONE ENCOUNTER
Citalopram refill request sent from mail order. LOV 05/11/17 and next appt 08/29/17. Will you approve refill ? Routed to Dr Zonia Costa.

## 2017-07-14 ENCOUNTER — PATIENT OUTREACH (OUTPATIENT)
Dept: CASE MANAGEMENT | Age: 67
End: 2017-07-14

## 2017-07-14 RX ORDER — CITALOPRAM 40 MG/1
TABLET ORAL
Qty: 90 TABLET | Refills: 1 | Status: SHIPPED | OUTPATIENT
Start: 2017-07-14 | End: 2018-01-19

## 2017-07-14 NOTE — PROGRESS NOTES
Attempted to contact pt no answer left detailed message for pt to call back.      Total time spent with communication and chart review this month to date: 3

## 2017-07-18 ENCOUNTER — PATIENT OUTREACH (OUTPATIENT)
Dept: CASE MANAGEMENT | Age: 67
End: 2017-07-18

## 2017-07-18 DIAGNOSIS — E11.69 HYPERLIPIDEMIA ASSOCIATED WITH TYPE 2 DIABETES MELLITUS (HCC): ICD-10-CM

## 2017-07-18 DIAGNOSIS — Z79.4 TYPE 2 DIABETES MELLITUS WITH HYPERGLYCEMIA, WITH LONG-TERM CURRENT USE OF INSULIN (HCC): ICD-10-CM

## 2017-07-18 DIAGNOSIS — F33.42 RECURRENT MAJOR DEPRESSIVE DISORDER, IN FULL REMISSION (HCC): ICD-10-CM

## 2017-07-18 DIAGNOSIS — Z85.41 HX OF CERVICAL CANCER: ICD-10-CM

## 2017-07-18 DIAGNOSIS — E78.5 HYPERLIPIDEMIA ASSOCIATED WITH TYPE 2 DIABETES MELLITUS (HCC): ICD-10-CM

## 2017-07-18 DIAGNOSIS — E11.65 TYPE 2 DIABETES MELLITUS WITH HYPERGLYCEMIA, WITH LONG-TERM CURRENT USE OF INSULIN (HCC): ICD-10-CM

## 2017-07-18 DIAGNOSIS — F31.9 BIPOLAR 1 DISORDER (HCC): ICD-10-CM

## 2017-07-18 DIAGNOSIS — I10 ESSENTIAL HYPERTENSION: ICD-10-CM

## 2017-07-18 PROCEDURE — 99490 CHRNC CARE MGMT STAFF 1ST 20: CPT

## 2017-07-18 RX ORDER — GLIMEPIRIDE 2 MG/1
TABLET ORAL
Qty: 180 TABLET | Refills: 1 | OUTPATIENT
Start: 2017-07-18

## 2017-07-18 RX ORDER — DONEPEZIL HYDROCHLORIDE 10 MG/1
TABLET, FILM COATED ORAL
Qty: 90 TABLET | Refills: 1 | OUTPATIENT
Start: 2017-07-18

## 2017-07-18 RX ORDER — LISINOPRIL 10 MG/1
TABLET ORAL
Qty: 180 TABLET | Refills: 1 | OUTPATIENT
Start: 2017-07-18

## 2017-07-18 RX ORDER — ATORVASTATIN CALCIUM 10 MG/1
TABLET, FILM COATED ORAL
Qty: 90 TABLET | Refills: 1 | OUTPATIENT
Start: 2017-07-18

## 2017-07-18 RX ORDER — MIRTAZAPINE 30 MG/1
TABLET, FILM COATED ORAL
Qty: 90 TABLET | Refills: 1 | OUTPATIENT
Start: 2017-07-18

## 2017-07-18 NOTE — PROGRESS NOTES
I want to know a little about you. • What do you do for fun? Deleonton they so flower arrangements. • Any hobbies? What Hobbies? Sewing   • What do you do for work?  Retired, use to be  in [employer] [facility] [employer] pt became disabl herself gets some rest and relaxation. • What would help relieve some of this stress? More money coming in and she is working on this.  Having someone there to care for her mom well her and her sister are not there caring for her (at least checking on her rotating sites? Ask where they rotate to. Yes, stomach side to side. Goals:  • What would you say is your biggest concerns about your health? Stress, eating habits and elevated labs. • What steps do you think you could take to work on this?  Wa full remission ([facility]     Type 2 diabetes mellitus with hyperglycemia, with long-term current use of insulin (HCC)     B12 deficiency     Essential hypertension     S/P left knee arthroscopy     Hyperlipidemia associated with type 2 diabetes mellitus ([facility] to see some better results in her numbers. Inez Lloyd is a retired  for [place] reports being born there and she now resides in [facility] and has been on disability for +20 years.  She helps her sister care for there 80year old ill mom Metformin - caused GI upset. Also on Eura [place] in the past, but she was told she didn't need it. Last A1c 8.2, previously 8.5, 7.8, 8.3, 9.2,9.5, 6.6. Pt admits to not eating well.   Pt reports she is in [place] for the summer with her mom helping care for h a healthy eating patterns with what she has in her home. Care Plan: Reviewed and updated where needed   Sending education on: healthy eating habits, stress and diabetes, exercise routines.    Time Spent This Encounter Total: 32 min medical record review,

## 2017-07-18 NOTE — TELEPHONE ENCOUNTER
Rx request for med Donepezil HCL 10 mg # 90 with 1 refill from THE [facility] Last refilled on 7/13/17 # 90 with 1 refill to THE [facility]. Rx denied. Rx request for med Atorvastatin Calcium 10 mg # 90 with 1 refill from THE [facility].  Last refill

## 2017-07-24 NOTE — PROGRESS NOTES
Coordination of education, review of chart, update to record, sending materials:Summer Safety  Time: 4 min

## 2017-08-14 ENCOUNTER — PATIENT OUTREACH (OUTPATIENT)
Dept: CASE MANAGEMENT | Age: 67
End: 2017-08-14

## 2017-08-14 NOTE — PROGRESS NOTES
Pt returned call stated she has a lot going on around her and this is way she wasn't been able to receive my calls. She apologizes and reassures that she want's to be in this program and know's it will benefit her.  Her daughter is premature labor 33 weeks

## 2017-08-21 ENCOUNTER — PATIENT OUTREACH (OUTPATIENT)
Dept: CASE MANAGEMENT | Age: 67
End: 2017-08-21

## 2017-08-21 NOTE — PROGRESS NOTES
Reviewed pt's chart and she has follow up with Dr. Edith Doss Aug 29 th so I will follow up with a call after her visit.    Total time spent with patient including chart review: 3  Time spent with patient this month: 9    Total time spent with communication and

## 2017-08-30 ENCOUNTER — PATIENT OUTREACH (OUTPATIENT)
Dept: CASE MANAGEMENT | Age: 67
End: 2017-08-30

## 2017-08-30 NOTE — PROGRESS NOTES
Reviewed pt's chart and will wait for charting to be complete for yesterday's visit before calling pt. Will call tomorrow.    Total time spent with patient including chart review: 2  Time spent with patient this month: 10    Total time spent with communicat

## 2017-08-31 ENCOUNTER — PATIENT OUTREACH (OUTPATIENT)
Dept: CASE MANAGEMENT | Age: 67
End: 2017-08-31

## 2017-08-31 DIAGNOSIS — I10 ESSENTIAL HYPERTENSION: ICD-10-CM

## 2017-08-31 DIAGNOSIS — F03.90 DEMENTIA WITHOUT BEHAVIORAL DISTURBANCE, UNSPECIFIED DEMENTIA TYPE (HCC): ICD-10-CM

## 2017-08-31 DIAGNOSIS — E11.65 TYPE 2 DIABETES MELLITUS WITH HYPERGLYCEMIA, WITH LONG-TERM CURRENT USE OF INSULIN (HCC): ICD-10-CM

## 2017-08-31 DIAGNOSIS — E78.5 HYPERLIPIDEMIA ASSOCIATED WITH TYPE 2 DIABETES MELLITUS (HCC): ICD-10-CM

## 2017-08-31 DIAGNOSIS — Z79.4 TYPE 2 DIABETES MELLITUS WITH HYPERGLYCEMIA, WITH LONG-TERM CURRENT USE OF INSULIN (HCC): ICD-10-CM

## 2017-08-31 DIAGNOSIS — E11.69 HYPERLIPIDEMIA ASSOCIATED WITH TYPE 2 DIABETES MELLITUS (HCC): ICD-10-CM

## 2017-08-31 DIAGNOSIS — F31.9 BIPOLAR 1 DISORDER (HCC): ICD-10-CM

## 2017-08-31 PROCEDURE — 99490 CHRNC CARE MGMT STAFF 1ST 20: CPT

## 2017-08-31 NOTE — PROGRESS NOTES
8/31/2017  Spoke to Kaylen at length about CCM, current care plan and reviewed meds and compliance.  Reviewed Patient Active Problem List:     Diverticulosis     Sjogren's syndrome ([facility] [address]     IBS (irritable bowel syndrome)     Bipolar 1 disorder ([facility] [address]     Imelda Sen been up/down she also feels like the stress contributes to this. She wants to restart her routine in a couple weeks since all these situations should have  down. Verbalizes felling better when she is exercising and eating healthy.    Diet: pt reports he Care Plan: Reviewed and updated where needed  Time Spent This Encounter Total: 24 min medical record review, telephone communication, care plan updates where needed, and education. Provided acknowledgment and validation to patient's concerns.    Monthly M

## 2017-09-02 DIAGNOSIS — E78.00 HYPERCHOLESTEROLEMIA: ICD-10-CM

## 2017-09-05 RX ORDER — RANITIDINE 300 MG/1
CAPSULE ORAL
Qty: 90 CAPSULE | Refills: 0 | Status: SHIPPED | OUTPATIENT
Start: 2017-09-05 | End: 2019-08-13

## 2017-09-06 DIAGNOSIS — Z79.4 TYPE 2 DIABETES MELLITUS WITH HYPERGLYCEMIA, WITH LONG-TERM CURRENT USE OF INSULIN (HCC): Primary | ICD-10-CM

## 2017-09-06 DIAGNOSIS — E11.65 TYPE 2 DIABETES MELLITUS WITH HYPERGLYCEMIA, WITH LONG-TERM CURRENT USE OF INSULIN (HCC): Primary | ICD-10-CM

## 2017-09-06 NOTE — TELEPHONE ENCOUNTER
[facility] pharmacy is calling for a refill request on lantus.  Pharmacy unable to send request.

## 2017-09-07 ENCOUNTER — PATIENT OUTREACH (OUTPATIENT)
Dept: CASE MANAGEMENT | Age: 67
End: 2017-09-07

## 2017-09-08 RX ORDER — DIAZEPAM 5 MG/1
5 TABLET ORAL DAILY PRN
Qty: 10 TABLET | Refills: 0 | OUTPATIENT
Start: 2017-09-08 | End: 2018-01-05

## 2017-09-08 NOTE — TELEPHONE ENCOUNTER
Pt requested Valium script to replace the one from 05/11/17 as she never used. Pharmacy wont accept it now due to date. Will you approve pended script to be phoned in  ? Routed to Dr Pranav Kirkland.

## 2017-09-08 NOTE — TELEPHONE ENCOUNTER
Patient requesting valium, brought a prescription to the pharmacy but it was dated January so they would not fill.  Patient states that her mother is dying and she is unable to sleep

## 2017-09-11 ENCOUNTER — PATIENT OUTREACH (OUTPATIENT)
Dept: CASE MANAGEMENT | Age: 67
End: 2017-09-11

## 2017-09-11 DIAGNOSIS — F03.90 DEMENTIA WITHOUT BEHAVIORAL DISTURBANCE, UNSPECIFIED DEMENTIA TYPE (HCC): ICD-10-CM

## 2017-09-11 DIAGNOSIS — Z79.4 TYPE 2 DIABETES MELLITUS WITH HYPERGLYCEMIA, WITH LONG-TERM CURRENT USE OF INSULIN (HCC): ICD-10-CM

## 2017-09-11 DIAGNOSIS — F31.9 BIPOLAR 1 DISORDER (HCC): ICD-10-CM

## 2017-09-11 DIAGNOSIS — I10 ESSENTIAL HYPERTENSION: ICD-10-CM

## 2017-09-11 DIAGNOSIS — E11.69 HYPERLIPIDEMIA ASSOCIATED WITH TYPE 2 DIABETES MELLITUS (HCC): ICD-10-CM

## 2017-09-11 DIAGNOSIS — F33.42 RECURRENT MAJOR DEPRESSIVE DISORDER, IN FULL REMISSION (HCC): ICD-10-CM

## 2017-09-11 DIAGNOSIS — E78.5 HYPERLIPIDEMIA ASSOCIATED WITH TYPE 2 DIABETES MELLITUS (HCC): ICD-10-CM

## 2017-09-11 DIAGNOSIS — Z85.41 HX OF CERVICAL CANCER: ICD-10-CM

## 2017-09-11 DIAGNOSIS — E11.65 TYPE 2 DIABETES MELLITUS WITH HYPERGLYCEMIA, WITH LONG-TERM CURRENT USE OF INSULIN (HCC): ICD-10-CM

## 2017-09-11 PROCEDURE — 99490 CHRNC CARE MGMT STAFF 1ST 20: CPT

## 2017-09-11 NOTE — PROGRESS NOTES
9/11/2017  Spoke to Kaylen at length about CCM, current care plan and reviewed meds and compliance.  Reviewed  Bipolar 1 disorder (hcc)  Dementia without behavioral disturbance, unspecified dementia type  Essential hypertension  Hx of cervical cancer  Hyper ([address])        [address] [facility] [place]  [place] [id number] [phone]          Goal: restart her healthy eating and exercise routine within

## 2017-09-28 ENCOUNTER — OFFICE VISIT (OUTPATIENT)
Dept: FAMILY MEDICINE CLINIC | Facility: CLINIC | Age: 67
End: 2017-09-28

## 2017-09-28 ENCOUNTER — HOSPITAL ENCOUNTER (OUTPATIENT)
Dept: GENERAL RADIOLOGY | Age: 67
Discharge: HOME | End: 2017-09-28
Attending: FAMILY MEDICINE
Payer: MEDICARE

## 2017-09-28 VITALS
WEIGHT: 155.19 LBS | HEART RATE: 70 BPM | DIASTOLIC BLOOD PRESSURE: 64 MMHG | OXYGEN SATURATION: 98 % | TEMPERATURE: 99 F | RESPIRATION RATE: 16 BRPM | BODY MASS INDEX: 26.17 KG/M2 | HEIGHT: 64.6 IN | SYSTOLIC BLOOD PRESSURE: 140 MMHG

## 2017-09-28 DIAGNOSIS — R05.9 COUGH: Primary | ICD-10-CM

## 2017-09-28 DIAGNOSIS — J18.9 COMMUNITY ACQUIRED PNEUMONIA, UNSPECIFIED LATERALITY: ICD-10-CM

## 2017-09-28 DIAGNOSIS — R05.9 COUGH: ICD-10-CM

## 2017-09-28 PROCEDURE — 99214 OFFICE O/P EST MOD 30 MIN: CPT | Performed by: FAMILY MEDICINE

## 2017-09-28 PROCEDURE — 71020 XR CHEST PA + LAT CHEST (CPT=71020): CPT | Performed by: FAMILY MEDICINE

## 2017-09-28 RX ORDER — ERGOCALCIFEROL 1.25 MG/1
CAPSULE ORAL
COMMUNITY
Start: 2017-07-13 | End: 2018-01-16 | Stop reason: ALTCHOICE

## 2017-09-28 NOTE — PROGRESS NOTES
Chief Complaint:  Patient presents with:  Pneumonia: Dx last week IN [place] - not feelling back to normal - given Azithromycin, Albuterol inhaler, and Virtussin cough syrup  Cough: lots of sputim production- green  Wheezing: was dx justus Pneumonia also a (irritable bowel syndrome)    • Lichen sclerosus    • Nerve pain    • PONV (postoperative nausea and vomiting)    • Sjogren's syndrome (HCC)    • Small bowel obstruction (HCC)    • Type II or unspecified type diabetes mellitus without mention of complicati MOUTH EVERY DAY Disp: 90 capsule Rfl: 0   CITALOPRAM HYDROBROMIDE 40 MG Oral Tab TAKE 1 TABLET EVERY DAY Disp: 90 tablet Rfl: 1   DONEPEZIL HCL 10 MG Oral Tab TAKE 1 TABLET EVERY EVENING Disp: 90 tablet Rfl: 1   LISINOPRIL 10 MG Oral Tab TAKE 1 TABLET TWIC (KENALOG) 0.1 % Apply Externally Cream Apply  topically 2 (two) times daily. Disp:  Rfl:    benzonatate 200 MG Oral Cap Take 1 capsule (200 mg total) by mouth 3 (three) times daily as needed for cough.  Disp: 20 capsule Rfl: 0     Allergies:    Syl Ott CXR given this was abnormal at urgent care. See patient instructions below  For supportive care plan    -     XR CHEST PA + LAT CHEST (CPT=71020); Future    1. Let's get a chest XRay  2. Start using the tessalon perles (benzonatate) for cough  3.  Increase

## 2017-09-28 NOTE — PATIENT INSTRUCTIONS
I would recommend:    1. Let's get a chest XRay  2. Start using the tessalon perles (benzonatate) for cough  3. Increase the flonase to twice daily  4.  Increase the albuterol inhaler to every 4-6 hours as needed for cough, wheezing or shortness of breath

## 2017-10-06 ENCOUNTER — OFFICE VISIT (OUTPATIENT)
Dept: FAMILY MEDICINE CLINIC | Facility: CLINIC | Age: 67
End: 2017-10-06

## 2017-10-06 VITALS
BODY MASS INDEX: 26.31 KG/M2 | DIASTOLIC BLOOD PRESSURE: 60 MMHG | SYSTOLIC BLOOD PRESSURE: 130 MMHG | HEART RATE: 72 BPM | HEIGHT: 64.5 IN | TEMPERATURE: 99 F | WEIGHT: 156 LBS

## 2017-10-06 DIAGNOSIS — E11.69 HYPERLIPIDEMIA ASSOCIATED WITH TYPE 2 DIABETES MELLITUS (HCC): ICD-10-CM

## 2017-10-06 DIAGNOSIS — E78.5 HYPERLIPIDEMIA ASSOCIATED WITH TYPE 2 DIABETES MELLITUS (HCC): ICD-10-CM

## 2017-10-06 DIAGNOSIS — I10 ESSENTIAL HYPERTENSION: ICD-10-CM

## 2017-10-06 DIAGNOSIS — E11.65 TYPE 2 DIABETES MELLITUS WITH HYPERGLYCEMIA, WITH LONG-TERM CURRENT USE OF INSULIN (HCC): Primary | ICD-10-CM

## 2017-10-06 DIAGNOSIS — Z79.4 TYPE 2 DIABETES MELLITUS WITH HYPERGLYCEMIA, WITH LONG-TERM CURRENT USE OF INSULIN (HCC): Primary | ICD-10-CM

## 2017-10-06 PROCEDURE — G0008 ADMIN INFLUENZA VIRUS VAC: HCPCS | Performed by: FAMILY MEDICINE

## 2017-10-06 PROCEDURE — 99214 OFFICE O/P EST MOD 30 MIN: CPT | Performed by: FAMILY MEDICINE

## 2017-10-06 PROCEDURE — 90653 IIV ADJUVANT VACCINE IM: CPT | Performed by: FAMILY MEDICINE

## 2017-10-06 NOTE — PROGRESS NOTES
HPI:   Irene Dash is a 79year old female who presents for recheck of her diabetes. DM dx at age 54. Current medication:  Lantus (started 8/2016) Taking 30 units at night and Glimepiride 2mg BID (increased 11/2016).    Has been on Metformin - caus tingling    EXAM:   /60   Pulse 72   Temp 98.7 °F (37.1 °C) (Oral)   Ht 64.5\"   Wt 156 lb   BMI 26.36 kg/m²   GENERAL: well developed, well nourished,in no apparent distress  SKIN: no rashes,no suspicious lesions  NECK: supple,no adenopathy,no carot

## 2017-10-09 ENCOUNTER — TELEPHONE (OUTPATIENT)
Dept: FAMILY MEDICINE CLINIC | Facility: CLINIC | Age: 67
End: 2017-10-09

## 2017-10-09 NOTE — TELEPHONE ENCOUNTER
Pt requesting referral to Dr. Opal Ferrari orthopedic to cover MRI of knee on 1/17/17.   Pt dropped off notice of denial from insurance and bill from [employer].  In Pat's referral inbox

## 2017-10-11 ENCOUNTER — PATIENT OUTREACH (OUTPATIENT)
Dept: CASE MANAGEMENT | Age: 67
End: 2017-10-11

## 2017-10-11 NOTE — PROGRESS NOTES
Attempted to contact pt no answer left detailed message for pt to call back.  Coordination of education, review of chart, update to pt record, compilation and mailing resources/materials: Flu education, Why get the flu vaccine, and request to get flu vaccin

## 2017-10-16 NOTE — TELEPHONE ENCOUNTER
This was for an MRI order by Dr Sinan bansal, It appears this was performed at an [facility] facility. Dr Cm Verma office was responsible for placing the referral through [facility].    Msg forward to [employer]

## 2017-10-17 ENCOUNTER — PATIENT OUTREACH (OUTPATIENT)
Dept: CASE MANAGEMENT | Age: 67
End: 2017-10-17

## 2017-10-17 DIAGNOSIS — I10 ESSENTIAL HYPERTENSION: ICD-10-CM

## 2017-10-17 DIAGNOSIS — F03.90 DEMENTIA WITHOUT BEHAVIORAL DISTURBANCE, UNSPECIFIED DEMENTIA TYPE (HCC): ICD-10-CM

## 2017-10-17 DIAGNOSIS — F33.42 RECURRENT MAJOR DEPRESSIVE DISORDER, IN FULL REMISSION (HCC): ICD-10-CM

## 2017-10-17 DIAGNOSIS — E11.65 TYPE 2 DIABETES MELLITUS WITH HYPERGLYCEMIA, WITH LONG-TERM CURRENT USE OF INSULIN (HCC): ICD-10-CM

## 2017-10-17 DIAGNOSIS — Z79.4 TYPE 2 DIABETES MELLITUS WITH HYPERGLYCEMIA, WITH LONG-TERM CURRENT USE OF INSULIN (HCC): ICD-10-CM

## 2017-10-17 DIAGNOSIS — Z85.41 HX OF CERVICAL CANCER: ICD-10-CM

## 2017-10-17 DIAGNOSIS — E78.5 HYPERLIPIDEMIA ASSOCIATED WITH TYPE 2 DIABETES MELLITUS (HCC): ICD-10-CM

## 2017-10-17 DIAGNOSIS — E11.69 HYPERLIPIDEMIA ASSOCIATED WITH TYPE 2 DIABETES MELLITUS (HCC): ICD-10-CM

## 2017-10-17 DIAGNOSIS — F31.9 BIPOLAR 1 DISORDER (HCC): ICD-10-CM

## 2017-10-17 PROCEDURE — 99490 CHRNC CARE MGMT STAFF 1ST 20: CPT

## 2017-10-17 NOTE — PROGRESS NOTES
10/17/2017  Spoke to Kaylen at length about CCM, current care plan and reviewed meds and compliance.  Reviewed Patient Active Problem List:     Diverticulosis     Sjogren's syndrome ([facility] [address]     IBS (irritable bowel syndrome)     Bipolar 1 disorder ([facility] [address]     D stationary bike everyday for 5 minutes slowly increasing time as tolerated. Pt reports she [de-identified] active with her grand kids and house chores throughout the day.    Stress: pt reports she has been feeling better and since her mother passing she can focus on min medical record review, telephone communication, care plan updates where needed, and education. Provided acknowledgment and validation to patient's concerns.    Monthly Minute Total including today: 32    Physical assessment, complete health history, and

## 2017-10-18 NOTE — TELEPHONE ENCOUNTER
I spoke to the pt who informed me she contacted her insurance and was told the bill for her MRI would be covered at 100%.   However, the pt was concerned why she was informed by Dr. Navneet Rosales office that the referral for her MRI of left knee that was done on 1/

## 2017-10-26 DIAGNOSIS — Z79.4 TYPE 2 DIABETES MELLITUS WITH HYPERGLYCEMIA, WITH LONG-TERM CURRENT USE OF INSULIN (HCC): Primary | ICD-10-CM

## 2017-10-26 DIAGNOSIS — E11.65 TYPE 2 DIABETES MELLITUS WITH HYPERGLYCEMIA, WITH LONG-TERM CURRENT USE OF INSULIN (HCC): Primary | ICD-10-CM

## 2017-10-27 RX ORDER — CALCIUM CITRATE/VITAMIN D3 200MG-6.25
TABLET ORAL
Qty: 100 STRIP | Refills: 3 | Status: SHIPPED | OUTPATIENT
Start: 2017-10-27 | End: 2019-03-04

## 2017-10-27 RX ORDER — CALCIUM CITRATE/VITAMIN D3 200MG-6.25
TABLET ORAL
Qty: 100 STRIP | Refills: 3 | Status: SHIPPED | OUTPATIENT
Start: 2017-10-27 | End: 2017-11-16

## 2017-11-13 DIAGNOSIS — E11.65 TYPE 2 DIABETES MELLITUS WITH HYPERGLYCEMIA, WITH LONG-TERM CURRENT USE OF INSULIN (HCC): ICD-10-CM

## 2017-11-13 DIAGNOSIS — Z79.4 TYPE 2 DIABETES MELLITUS WITH HYPERGLYCEMIA, WITH LONG-TERM CURRENT USE OF INSULIN (HCC): ICD-10-CM

## 2017-11-13 NOTE — TELEPHONE ENCOUNTER
Talya Mock requesting new script for Lantus/ Pt is currently taking 33 Units. LOV 10/6/2017     Future Appointments  Date Time Provider Elvin Osman   1/5/2018 9:00 AM Lex Peralta MD [facility] [place]        Refill request for:      Pending Pres

## 2017-11-16 ENCOUNTER — PATIENT OUTREACH (OUTPATIENT)
Dept: CASE MANAGEMENT | Age: 67
End: 2017-11-16

## 2017-11-16 DIAGNOSIS — E78.5 HYPERLIPIDEMIA ASSOCIATED WITH TYPE 2 DIABETES MELLITUS (HCC): ICD-10-CM

## 2017-11-16 DIAGNOSIS — Z79.4 TYPE 2 DIABETES MELLITUS WITH HYPERGLYCEMIA, WITH LONG-TERM CURRENT USE OF INSULIN (HCC): ICD-10-CM

## 2017-11-16 DIAGNOSIS — F31.9 BIPOLAR 1 DISORDER (HCC): ICD-10-CM

## 2017-11-16 DIAGNOSIS — F03.90 DEMENTIA WITHOUT BEHAVIORAL DISTURBANCE, UNSPECIFIED DEMENTIA TYPE (HCC): ICD-10-CM

## 2017-11-16 DIAGNOSIS — E11.65 TYPE 2 DIABETES MELLITUS WITH HYPERGLYCEMIA, WITH LONG-TERM CURRENT USE OF INSULIN (HCC): ICD-10-CM

## 2017-11-16 DIAGNOSIS — I10 ESSENTIAL HYPERTENSION: ICD-10-CM

## 2017-11-16 DIAGNOSIS — E11.69 HYPERLIPIDEMIA ASSOCIATED WITH TYPE 2 DIABETES MELLITUS (HCC): ICD-10-CM

## 2017-11-16 DIAGNOSIS — F33.42 RECURRENT MAJOR DEPRESSIVE DISORDER, IN FULL REMISSION (HCC): ICD-10-CM

## 2017-11-16 PROCEDURE — 99490 CHRNC CARE MGMT STAFF 1ST 20: CPT

## 2017-11-16 NOTE — PROGRESS NOTES
11/16/2017  Spoke to Kaylen at length about CCM, current care plan and reviewed meds and compliance.  Reviewed Patient Active Problem List:     Diverticulosis     Sjogren's syndrome ([facility] [address]     IBS (irritable bowel syndrome)     Bipolar 1 disorder ([facility] [address]     D barriers are present that could prevent compliance with medication regimen. At this time, no barriers reported. Deirdre Monsivaisomon reports is stable on all medications and denies experiencing any side effects.     Your appointments     Date & Time Appointment Cindy Awad

## 2017-12-19 ENCOUNTER — PATIENT OUTREACH (OUTPATIENT)
Dept: CASE MANAGEMENT | Age: 67
End: 2017-12-19

## 2018-01-05 ENCOUNTER — TELEPHONE (OUTPATIENT)
Dept: FAMILY MEDICINE CLINIC | Facility: CLINIC | Age: 68
End: 2018-01-05

## 2018-01-05 ENCOUNTER — OFFICE VISIT (OUTPATIENT)
Dept: FAMILY MEDICINE CLINIC | Facility: CLINIC | Age: 68
End: 2018-01-05

## 2018-01-05 VITALS
HEART RATE: 72 BPM | DIASTOLIC BLOOD PRESSURE: 60 MMHG | BODY MASS INDEX: 27.29 KG/M2 | SYSTOLIC BLOOD PRESSURE: 130 MMHG | TEMPERATURE: 98 F | RESPIRATION RATE: 16 BRPM | HEIGHT: 63 IN | WEIGHT: 154 LBS

## 2018-01-05 DIAGNOSIS — Z79.4 TYPE 2 DIABETES MELLITUS WITH HYPERGLYCEMIA, WITH LONG-TERM CURRENT USE OF INSULIN (HCC): Primary | ICD-10-CM

## 2018-01-05 DIAGNOSIS — N39.46 MIXED INCONTINENCE: ICD-10-CM

## 2018-01-05 DIAGNOSIS — E11.65 TYPE 2 DIABETES MELLITUS WITH HYPERGLYCEMIA, WITH LONG-TERM CURRENT USE OF INSULIN (HCC): Primary | ICD-10-CM

## 2018-01-05 DIAGNOSIS — M65.311 TRIGGER FINGER OF RIGHT THUMB: ICD-10-CM

## 2018-01-05 DIAGNOSIS — G25.81 RLS (RESTLESS LEGS SYNDROME): ICD-10-CM

## 2018-01-05 DIAGNOSIS — E78.5 HYPERLIPIDEMIA ASSOCIATED WITH TYPE 2 DIABETES MELLITUS (HCC): ICD-10-CM

## 2018-01-05 DIAGNOSIS — E11.69 HYPERLIPIDEMIA ASSOCIATED WITH TYPE 2 DIABETES MELLITUS (HCC): ICD-10-CM

## 2018-01-05 LAB
CARTRIDGE LOT#: ABNORMAL NUMERIC
HEMOGLOBIN A1C: 7.4 % (ref 4.3–5.6)

## 2018-01-05 PROCEDURE — 99214 OFFICE O/P EST MOD 30 MIN: CPT | Performed by: FAMILY MEDICINE

## 2018-01-05 RX ORDER — DIAZEPAM 5 MG/1
5 TABLET ORAL DAILY PRN
Qty: 30 TABLET | Refills: 1 | Status: SHIPPED | OUTPATIENT
Start: 2018-01-05 | End: 2018-10-05

## 2018-01-05 RX ORDER — OXYBUTYNIN CHLORIDE 10 MG/1
10 TABLET, EXTENDED RELEASE ORAL DAILY
Qty: 90 TABLET | Refills: 1 | Status: SHIPPED | OUTPATIENT
Start: 2018-01-05 | End: 2018-03-29 | Stop reason: SINTOL

## 2018-01-05 RX ORDER — IBUPROFEN 200 MG
TABLET ORAL
COMMUNITY
End: 2018-02-15

## 2018-01-05 NOTE — PROGRESS NOTES
HPI:   Hal Low is a 79year old female who presents for recheck of her diabetes. DM dx at age 54. Current medication:  Lantus (started 8/2016) Taking 30 units at night and Glimepiride 2mg BID (increased 11/2016).   When she tried increasing [place] 58   ----------     REVIEW OF SYSTEMS:   GENERAL HEALTH: feels well otherwise  SKIN: denies any unusual skin lesions or rashes  RESPIRATORY: denies shortness of breath with exertion  CARDIOVASCULAR: denies chest pain on exertion  GI: denies abdominal pain Imaging & Consults:  None

## 2018-01-05 NOTE — TELEPHONE ENCOUNTER
Patient is scheduled for her super visit 04/05/18 will she need blood work prior to appointment?  If so please send orders to [facility] and advise patient

## 2018-01-16 ENCOUNTER — PATIENT OUTREACH (OUTPATIENT)
Dept: CASE MANAGEMENT | Age: 68
End: 2018-01-16

## 2018-01-16 DIAGNOSIS — E78.5 HYPERLIPIDEMIA ASSOCIATED WITH TYPE 2 DIABETES MELLITUS (HCC): ICD-10-CM

## 2018-01-16 DIAGNOSIS — F33.42 RECURRENT MAJOR DEPRESSIVE DISORDER, IN FULL REMISSION (HCC): ICD-10-CM

## 2018-01-16 DIAGNOSIS — Z79.4 TYPE 2 DIABETES MELLITUS WITH HYPERGLYCEMIA, WITH LONG-TERM CURRENT USE OF INSULIN (HCC): ICD-10-CM

## 2018-01-16 DIAGNOSIS — E11.69 HYPERLIPIDEMIA ASSOCIATED WITH TYPE 2 DIABETES MELLITUS (HCC): ICD-10-CM

## 2018-01-16 DIAGNOSIS — F03.90 DEMENTIA WITHOUT BEHAVIORAL DISTURBANCE, UNSPECIFIED DEMENTIA TYPE (HCC): ICD-10-CM

## 2018-01-16 DIAGNOSIS — I10 ESSENTIAL HYPERTENSION: ICD-10-CM

## 2018-01-16 DIAGNOSIS — F31.9 BIPOLAR 1 DISORDER (HCC): ICD-10-CM

## 2018-01-16 DIAGNOSIS — Z85.41 HX OF CERVICAL CANCER: ICD-10-CM

## 2018-01-16 DIAGNOSIS — E11.65 TYPE 2 DIABETES MELLITUS WITH HYPERGLYCEMIA, WITH LONG-TERM CURRENT USE OF INSULIN (HCC): ICD-10-CM

## 2018-01-16 PROCEDURE — 99490 CHRNC CARE MGMT STAFF 1ST 20: CPT

## 2018-01-16 RX ORDER — ALBUTEROL SULFATE 90 UG/1
2 AEROSOL, METERED RESPIRATORY (INHALATION) EVERY 6 HOURS PRN
COMMUNITY

## 2018-01-16 NOTE — PROGRESS NOTES
1/16/2018  Spoke to Hunter Holland at length about CCM, current care plan and reviewed meds and compliance.  Reviewed Patient Active Problem List:     Diverticulosis     Sjogren's syndrome ([facility] [address]     IBS (irritable bowel syndrome)     Bipolar 1 disorder ([facility] [address]     Catrina Ugalde to incorporate her stationary bike for 5 minutes 2 x a week slowly increasing time as tolerated. Stress: pt reports she is over all doing well and manages her stressors.  Pt states her moods fluctuate but she is over coming with sewing and takes her medica

## 2018-01-19 DIAGNOSIS — I10 ESSENTIAL HYPERTENSION: ICD-10-CM

## 2018-01-19 RX ORDER — GLIMEPIRIDE 2 MG/1
TABLET ORAL
Qty: 180 TABLET | Refills: 1 | Status: SHIPPED | OUTPATIENT
Start: 2018-01-19 | End: 2018-10-17

## 2018-01-19 RX ORDER — BUPROPION HYDROCHLORIDE 300 MG/1
TABLET ORAL
Qty: 90 TABLET | Refills: 1 | Status: SHIPPED | OUTPATIENT
Start: 2018-01-19 | End: 2018-07-09

## 2018-01-19 RX ORDER — DILTIAZEM HYDROCHLORIDE 360 MG/1
CAPSULE, EXTENDED RELEASE ORAL
Qty: 90 CAPSULE | Refills: 1 | Status: SHIPPED | OUTPATIENT
Start: 2018-01-19 | End: 2018-10-17

## 2018-01-19 RX ORDER — LISINOPRIL 10 MG/1
TABLET ORAL
Qty: 180 TABLET | Refills: 1 | Status: SHIPPED | OUTPATIENT
Start: 2018-01-19 | End: 2018-06-18

## 2018-01-19 RX ORDER — MIRTAZAPINE 30 MG/1
TABLET, FILM COATED ORAL
Qty: 90 TABLET | Refills: 1 | Status: SHIPPED | OUTPATIENT
Start: 2018-01-19 | End: 2018-10-16 | Stop reason: ALTCHOICE

## 2018-01-19 RX ORDER — ATORVASTATIN CALCIUM 10 MG/1
TABLET, FILM COATED ORAL
Qty: 90 TABLET | Refills: 1 | Status: SHIPPED | OUTPATIENT
Start: 2018-01-19 | End: 2018-10-17

## 2018-01-19 RX ORDER — CITALOPRAM 40 MG/1
TABLET ORAL
Qty: 90 TABLET | Refills: 1 | Status: SHIPPED | OUTPATIENT
Start: 2018-01-19 | End: 2018-10-17

## 2018-01-19 RX ORDER — DONEPEZIL HYDROCHLORIDE 10 MG/1
TABLET, FILM COATED ORAL
Qty: 90 TABLET | Refills: 1 | Status: SHIPPED | OUTPATIENT
Start: 2018-01-19 | End: 2018-10-17

## 2018-02-12 RX ORDER — ISOPROPYL ALCOHOL 0.75 G/1
SWAB TOPICAL
Qty: 600 EACH | Refills: 1 | Status: SHIPPED | OUTPATIENT
Start: 2018-02-12 | End: 2019-05-24

## 2018-02-12 RX ORDER — BUPROPION HYDROCHLORIDE 300 MG/1
TABLET ORAL
Qty: 90 TABLET | Refills: 1 | OUTPATIENT
Start: 2018-02-12

## 2018-02-12 RX ORDER — CITALOPRAM 40 MG/1
TABLET ORAL
Qty: 90 TABLET | Refills: 1 | OUTPATIENT
Start: 2018-02-12

## 2018-02-15 ENCOUNTER — OFFICE VISIT (OUTPATIENT)
Dept: FAMILY MEDICINE CLINIC | Facility: CLINIC | Age: 68
End: 2018-02-15

## 2018-02-15 VITALS
TEMPERATURE: 98 F | BODY MASS INDEX: 27.64 KG/M2 | DIASTOLIC BLOOD PRESSURE: 60 MMHG | SYSTOLIC BLOOD PRESSURE: 136 MMHG | HEIGHT: 63 IN | RESPIRATION RATE: 16 BRPM | HEART RATE: 84 BPM | WEIGHT: 156 LBS

## 2018-02-15 DIAGNOSIS — N39.46 MIXED STRESS AND URGE URINARY INCONTINENCE: ICD-10-CM

## 2018-02-15 DIAGNOSIS — L90.0 LICHEN SCLEROSUS: Primary | ICD-10-CM

## 2018-02-15 DIAGNOSIS — J06.9 VIRAL URI: ICD-10-CM

## 2018-02-15 PROCEDURE — 99214 OFFICE O/P EST MOD 30 MIN: CPT | Performed by: FAMILY MEDICINE

## 2018-02-15 RX ORDER — FLUCONAZOLE 150 MG/1
TABLET ORAL
Qty: 2 TABLET | Refills: 0 | Status: SHIPPED | OUTPATIENT
Start: 2018-02-15 | End: 2018-05-09 | Stop reason: ALTCHOICE

## 2018-02-15 RX ORDER — MAG HYDROX/ALUMINUM HYD/SIMETH 400-400-40
SUSPENSION, ORAL (FINAL DOSE FORM) ORAL
COMMUNITY
Start: 2018-01-19

## 2018-02-15 RX ORDER — ERGOCALCIFEROL 1.25 MG/1
CAPSULE ORAL
COMMUNITY
Start: 2018-01-10 | End: 2018-03-20

## 2018-02-15 RX ORDER — MELATONIN
2 TIMES DAILY
COMMUNITY
Start: 2018-01-10

## 2018-02-16 ENCOUNTER — PATIENT OUTREACH (OUTPATIENT)
Dept: CASE MANAGEMENT | Age: 68
End: 2018-02-16

## 2018-02-16 DIAGNOSIS — E11.69 HYPERLIPIDEMIA ASSOCIATED WITH TYPE 2 DIABETES MELLITUS (HCC): ICD-10-CM

## 2018-02-16 DIAGNOSIS — F31.9 BIPOLAR 1 DISORDER (HCC): ICD-10-CM

## 2018-02-16 DIAGNOSIS — E78.5 HYPERLIPIDEMIA ASSOCIATED WITH TYPE 2 DIABETES MELLITUS (HCC): ICD-10-CM

## 2018-02-16 DIAGNOSIS — Z79.4 TYPE 2 DIABETES MELLITUS WITH HYPERGLYCEMIA, WITH LONG-TERM CURRENT USE OF INSULIN (HCC): ICD-10-CM

## 2018-02-16 DIAGNOSIS — F03.90 DEMENTIA WITHOUT BEHAVIORAL DISTURBANCE, UNSPECIFIED DEMENTIA TYPE (HCC): ICD-10-CM

## 2018-02-16 DIAGNOSIS — Z85.41 HX OF CERVICAL CANCER: ICD-10-CM

## 2018-02-16 DIAGNOSIS — I10 ESSENTIAL HYPERTENSION: ICD-10-CM

## 2018-02-16 DIAGNOSIS — E11.65 TYPE 2 DIABETES MELLITUS WITH HYPERGLYCEMIA, WITH LONG-TERM CURRENT USE OF INSULIN (HCC): ICD-10-CM

## 2018-02-16 PROCEDURE — 99490 CHRNC CARE MGMT STAFF 1ST 20: CPT

## 2018-02-16 NOTE — PROGRESS NOTES
2/16/2018  Spoke to Kaylen for CCM. Updates to patient care team/comments: n/a   Patient reported changes in medications: no changes per pt. Med Adherence  Comment: pt reports taking all medications as prescribed. Health Maintenance:    Your raymond assessment, complete health history, and need for CCM established by Isidro Morales MD.

## 2018-02-28 DIAGNOSIS — K21.9 GERD WITHOUT ESOPHAGITIS: ICD-10-CM

## 2018-03-01 RX ORDER — RANITIDINE 300 MG/1
CAPSULE ORAL
Qty: 90 CAPSULE | Refills: 0 | Status: SHIPPED | OUTPATIENT
Start: 2018-03-01 | End: 2018-04-05

## 2018-03-07 ENCOUNTER — TELEPHONE (OUTPATIENT)
Dept: FAMILY MEDICINE CLINIC | Facility: CLINIC | Age: 68
End: 2018-03-07

## 2018-03-07 DIAGNOSIS — L90.0 LICHEN SCLEROSUS: Primary | ICD-10-CM

## 2018-03-07 NOTE — TELEPHONE ENCOUNTER
Patient requesting referral to Dr. Dat Jose. Last seen by Dr. Roel De La Paz 2/15/18. Patient was recommended by Dr. Jacky Lopez to see gynecology for diagnosis of Lichen sclerosus to the labia minora.     Office notes from Dr. Jacky Lopez   Assessment/Plan:      1.) Lichen coordination of care and patient verbalized understanding: Yes  Patient's questions answered: Yes  Handout: yes  No Complications, Pt left exam room in good condition: yes        Return to Clinic: 1 month  Reason: f/u LSA   Scribed by: JESSICA Mckeon

## 2018-03-14 ENCOUNTER — PATIENT OUTREACH (OUTPATIENT)
Dept: CASE MANAGEMENT | Age: 68
End: 2018-03-14

## 2018-03-20 ENCOUNTER — PATIENT OUTREACH (OUTPATIENT)
Dept: CASE MANAGEMENT | Age: 68
End: 2018-03-20

## 2018-03-20 DIAGNOSIS — E11.65 TYPE 2 DIABETES MELLITUS WITH HYPERGLYCEMIA, WITH LONG-TERM CURRENT USE OF INSULIN (HCC): ICD-10-CM

## 2018-03-20 DIAGNOSIS — F31.9 BIPOLAR 1 DISORDER (HCC): ICD-10-CM

## 2018-03-20 DIAGNOSIS — Z79.4 TYPE 2 DIABETES MELLITUS WITH HYPERGLYCEMIA, WITH LONG-TERM CURRENT USE OF INSULIN (HCC): ICD-10-CM

## 2018-03-20 DIAGNOSIS — F03.90 DEMENTIA WITHOUT BEHAVIORAL DISTURBANCE, UNSPECIFIED DEMENTIA TYPE (HCC): ICD-10-CM

## 2018-03-20 DIAGNOSIS — F33.42 RECURRENT MAJOR DEPRESSIVE DISORDER, IN FULL REMISSION (HCC): ICD-10-CM

## 2018-03-20 DIAGNOSIS — E78.5 HYPERLIPIDEMIA ASSOCIATED WITH TYPE 2 DIABETES MELLITUS (HCC): ICD-10-CM

## 2018-03-20 DIAGNOSIS — G47.00 INSOMNIA, UNSPECIFIED TYPE: ICD-10-CM

## 2018-03-20 DIAGNOSIS — Z85.41 HX OF CERVICAL CANCER: ICD-10-CM

## 2018-03-20 DIAGNOSIS — I10 ESSENTIAL HYPERTENSION: ICD-10-CM

## 2018-03-20 DIAGNOSIS — E11.69 HYPERLIPIDEMIA ASSOCIATED WITH TYPE 2 DIABETES MELLITUS (HCC): ICD-10-CM

## 2018-03-20 PROCEDURE — 99490 CHRNC CARE MGMT STAFF 1ST 20: CPT

## 2018-03-20 RX ORDER — MAG HYDROX/ALUMINUM HYD/SIMETH 400-400-40
SUSPENSION, ORAL (FINAL DOSE FORM) ORAL
COMMUNITY
Start: 2018-01-10 | End: 2018-11-05

## 2018-03-20 RX ORDER — MAGNESIUM OXIDE 400 MG (241.3 MG MAGNESIUM) TABLET
TABLET
COMMUNITY
Start: 2018-01-10 | End: 2018-05-09

## 2018-03-20 NOTE — PROGRESS NOTES
3/20/2018  Spoke to Aristeo Monahan for CCM. Updates to patient care team/comments: yes, added. Patient reported changes in medications: no changes. Med Adherence  Comment: pt reports taking all medications as prescribed. Health Maintenance:    Your ap Understanding verbalized. Telephone encounter sent to [facility] 03 triage. Self Management Goals/Action Plan:  Pt reports she has been dealing with new issues Lichen Sclerosus Atrophicus to labia Minora and in pain.  Pt has several scheduled doctors appoint

## 2018-03-20 NOTE — TELEPHONE ENCOUNTER
It seems Tramadol is not being issued by our office, it appears it was issued by [facility], however the Ángela Mouse is showing Dr VILLASENOR [facility] and Sabino Broussard as issuing prescriptions filled in 2017 and issued in 2016

## 2018-03-20 NOTE — TELEPHONE ENCOUNTER
Triage: Good Afternoon :)     Pt wondering if Dr. Tammie Chávez can refill her Tramadol prescription. Notified pt I would contact Dr. Johnny Long (triage) office and nurse will call her back for further assessment. Understanding verbalized by pt.    LOV 2/15/2018, LUCÍA

## 2018-03-21 RX ORDER — TRAMADOL HYDROCHLORIDE 50 MG/1
50 TABLET ORAL EVERY 8 HOURS PRN
Qty: 30 TABLET | Refills: 0 | OUTPATIENT
Start: 2018-03-21 | End: 2018-08-15

## 2018-03-21 NOTE — TELEPHONE ENCOUNTER
LOV 2/15/2018 with Dr Carmen Yee we have never given her this medication having issues with the Lichen sclerosus causing pain and Advil is not helping with the pain.  The patient said that CCM thought that with her taking tramadol before that this would be a go

## 2018-03-26 ENCOUNTER — TELEPHONE (OUTPATIENT)
Dept: FAMILY MEDICINE CLINIC | Facility: CLINIC | Age: 68
End: 2018-03-26

## 2018-03-26 NOTE — TELEPHONE ENCOUNTER
Patient is calling she said that the pharmacy never got the refill for the script TraMADol HCl 50 MG Oral Tab  Please phone into [facility]. Thank you.

## 2018-03-26 NOTE — TELEPHONE ENCOUNTER
Called tramadol into [facility] as requested by patient and ordered by Dr Nash Qureshi called patient LMOM in detail of calling in RX

## 2018-03-28 ENCOUNTER — LAB ENCOUNTER (OUTPATIENT)
Dept: LAB | Age: 68
End: 2018-03-28
Attending: FAMILY MEDICINE
Payer: MEDICARE

## 2018-03-28 DIAGNOSIS — E11.69 HYPERLIPIDEMIA ASSOCIATED WITH TYPE 2 DIABETES MELLITUS (HCC): ICD-10-CM

## 2018-03-28 DIAGNOSIS — Z79.4 TYPE 2 DIABETES MELLITUS WITH HYPERGLYCEMIA, WITH LONG-TERM CURRENT USE OF INSULIN (HCC): ICD-10-CM

## 2018-03-28 DIAGNOSIS — E11.65 TYPE 2 DIABETES MELLITUS WITH HYPERGLYCEMIA, WITH LONG-TERM CURRENT USE OF INSULIN (HCC): ICD-10-CM

## 2018-03-28 DIAGNOSIS — E78.5 HYPERLIPIDEMIA ASSOCIATED WITH TYPE 2 DIABETES MELLITUS (HCC): ICD-10-CM

## 2018-03-28 LAB
ALBUMIN SERPL-MCNC: 3.5 G/DL (ref 3.5–4.8)
ALP LIVER SERPL-CCNC: 141 U/L (ref 55–142)
ALT SERPL-CCNC: 50 U/L (ref 14–54)
AST SERPL-CCNC: 35 U/L (ref 15–41)
BILIRUB SERPL-MCNC: 0.2 MG/DL (ref 0.1–2)
BUN BLD-MCNC: 24 MG/DL (ref 8–20)
CALCIUM BLD-MCNC: 8.8 MG/DL (ref 8.3–10.3)
CHLORIDE: 104 MMOL/L (ref 101–111)
CHOLEST SMN-MCNC: 143 MG/DL (ref ?–200)
CO2: 27 MMOL/L (ref 22–32)
CREAT BLD-MCNC: 0.88 MG/DL (ref 0.55–1.02)
EST. AVERAGE GLUCOSE BLD GHB EST-MCNC: 183 MG/DL (ref 68–126)
GLUCOSE BLD-MCNC: 251 MG/DL (ref 70–99)
HBA1C MFR BLD HPLC: 8 % (ref ?–5.7)
HDLC SERPL-MCNC: 33 MG/DL (ref 45–?)
HDLC SERPL: 4.33 {RATIO} (ref ?–4.44)
LDLC SERPL CALC-MCNC: 51 MG/DL (ref ?–130)
M PROTEIN MFR SERPL ELPH: 7 G/DL (ref 6.1–8.3)
NONHDLC SERPL-MCNC: 110 MG/DL (ref ?–130)
POTASSIUM SERPL-SCNC: 4.3 MMOL/L (ref 3.6–5.1)
SODIUM SERPL-SCNC: 139 MMOL/L (ref 136–144)
TRIGL SERPL-MCNC: 297 MG/DL (ref ?–150)
VLDLC SERPL CALC-MCNC: 59 MG/DL (ref 5–40)

## 2018-03-28 PROCEDURE — 36415 COLL VENOUS BLD VENIPUNCTURE: CPT

## 2018-03-28 PROCEDURE — 83036 HEMOGLOBIN GLYCOSYLATED A1C: CPT

## 2018-03-28 PROCEDURE — 80053 COMPREHEN METABOLIC PANEL: CPT

## 2018-03-28 PROCEDURE — 80061 LIPID PANEL: CPT

## 2018-03-29 ENCOUNTER — OFFICE VISIT (OUTPATIENT)
Dept: OBGYN CLINIC | Facility: CLINIC | Age: 68
End: 2018-03-29

## 2018-03-29 VITALS
WEIGHT: 157 LBS | DIASTOLIC BLOOD PRESSURE: 72 MMHG | SYSTOLIC BLOOD PRESSURE: 148 MMHG | BODY MASS INDEX: 27.82 KG/M2 | HEIGHT: 63 IN

## 2018-03-29 DIAGNOSIS — N39.42 URINARY INCONTINENCE WITHOUT SENSORY AWARENESS: Primary | ICD-10-CM

## 2018-03-29 DIAGNOSIS — Z12.31 BREAST CANCER SCREENING BY MAMMOGRAM: ICD-10-CM

## 2018-03-29 DIAGNOSIS — L90.0 LICHEN SCLEROSUS: ICD-10-CM

## 2018-03-29 PROCEDURE — 99203 OFFICE O/P NEW LOW 30 MIN: CPT | Performed by: OBSTETRICS & GYNECOLOGY

## 2018-03-29 RX ORDER — SOLIFENACIN SUCCINATE 10 MG/1
10 TABLET, FILM COATED ORAL DAILY
Qty: 30 TABLET | Refills: 1 | Status: SHIPPED | OUTPATIENT
Start: 2018-03-29 | End: 2018-07-20

## 2018-03-29 NOTE — PROGRESS NOTES
New gynecology visit. 76year old G 3 P 3013 white female. No LMP recorded. Patient is postmenopausal. LMP 2000. Here for evaluation and treatment of lichen sclerosis and urinary incontinence.  Diagnosed with lichen sclerosis in 8722 with skin biopsy (irritable bowel syndrome)    • Lichen sclerosus    • Nerve pain    • PONV (postoperative nausea and vomiting)    • Sjogren's syndrome (HCC)    • Small bowel obstruction (HCC)    • Type II or unspecified type diabetes mellitus without mention of complicati tab, then repeat dose in 3 days. Disp: 2 tablet Rfl: 0   BD SWAB SINGLE USE REGULAR Does not apply Pads USE 3 TO 6 TIMES DAILY AS DIRECTED.  Disp: 600 each Rfl: 1   MIRTAZAPINE 30 MG Oral Tab TAKE 1 TABLET EVERY EVENING Disp: 90 tablet Rfl: 1   DILTIAZEM HC daily. Disp: 90 tablet Rfl: 1   benzonatate 200 MG Oral Cap Take 1 capsule (200 mg total) by mouth 3 (three) times daily as needed for cough. Disp: 20 capsule Rfl: 0   Vitamin C 500 MG Oral Tab Take 500 mg by mouth daily.  Disp:  Rfl:    Multiple Vitamins-M nighty but needs to direct ointment to introitus and not just externally. Xylocaine ointment nightly also for pain relief and up to twice daily as needed. Change to Vesicare - if no improvement, send for urodynamics and urogynecology consultation.   See in

## 2018-03-30 ENCOUNTER — TELEPHONE (OUTPATIENT)
Dept: FAMILY MEDICINE CLINIC | Facility: CLINIC | Age: 68
End: 2018-03-30

## 2018-04-02 ENCOUNTER — TELEPHONE (OUTPATIENT)
Dept: OBGYN CLINIC | Facility: CLINIC | Age: 68
End: 2018-04-02

## 2018-04-02 RX ORDER — LIDOCAINE 50 MG/G
1 OINTMENT TOPICAL DAILY
Qty: 1 TUBE | Refills: 0 | Status: SHIPPED | OUTPATIENT
Start: 2018-04-02

## 2018-04-03 ENCOUNTER — TELEPHONE (OUTPATIENT)
Dept: OBGYN CLINIC | Facility: CLINIC | Age: 68
End: 2018-04-03

## 2018-04-03 NOTE — TELEPHONE ENCOUNTER
Pt states Lidocaine is not covered by insurance. Pt advised we will check with pharmacy on [place]. Patient verbalized understanding.

## 2018-04-03 NOTE — TELEPHONE ENCOUNTER
PT would like a call back from the nurse to discuss one of the medications that is not being covered by the insurance. Please call her back to discuss.

## 2018-04-04 NOTE — TELEPHONE ENCOUNTER
Per pharmacist at [facility], Lidocaine 5% ointment not covered by patient insurance. Pharmacist recommends patient using OTC Aspercreme with Lidocaine 4%. Only active ingredient is Lidocaine. Routed to Dr. Jereld Heimlich.   Please advise if OK for patient to use

## 2018-04-05 ENCOUNTER — OFFICE VISIT (OUTPATIENT)
Dept: FAMILY MEDICINE CLINIC | Facility: CLINIC | Age: 68
End: 2018-04-05

## 2018-04-05 VITALS
HEIGHT: 63.25 IN | WEIGHT: 158 LBS | BODY MASS INDEX: 27.65 KG/M2 | DIASTOLIC BLOOD PRESSURE: 64 MMHG | HEART RATE: 100 BPM | RESPIRATION RATE: 16 BRPM | SYSTOLIC BLOOD PRESSURE: 148 MMHG

## 2018-04-05 DIAGNOSIS — Z12.31 BREAST CANCER SCREENING BY MAMMOGRAM: ICD-10-CM

## 2018-04-05 DIAGNOSIS — G47.00 INSOMNIA, UNSPECIFIED TYPE: ICD-10-CM

## 2018-04-05 DIAGNOSIS — F31.9 BIPOLAR 1 DISORDER (HCC): ICD-10-CM

## 2018-04-05 DIAGNOSIS — J44.9 ASTHMA WITH COPD (CHRONIC OBSTRUCTIVE PULMONARY DISEASE) (HCC): Chronic | ICD-10-CM

## 2018-04-05 DIAGNOSIS — I70.0 AORTIC CALCIFICATION (HCC): ICD-10-CM

## 2018-04-05 DIAGNOSIS — K21.9 GASTROESOPHAGEAL REFLUX DISEASE WITHOUT ESOPHAGITIS: ICD-10-CM

## 2018-04-05 DIAGNOSIS — M35.00 SJOGREN'S SYNDROME, WITH UNSPECIFIED ORGAN INVOLVEMENT (HCC): ICD-10-CM

## 2018-04-05 DIAGNOSIS — Z79.4 TYPE 2 DIABETES MELLITUS WITH HYPERGLYCEMIA, WITH LONG-TERM CURRENT USE OF INSULIN (HCC): ICD-10-CM

## 2018-04-05 DIAGNOSIS — E11.65 TYPE 2 DIABETES MELLITUS WITH HYPERGLYCEMIA, WITH LONG-TERM CURRENT USE OF INSULIN (HCC): ICD-10-CM

## 2018-04-05 DIAGNOSIS — K11.7 XEROSTOMIA DUE TO AUTOIMMUNE DISEASE (HCC): ICD-10-CM

## 2018-04-05 DIAGNOSIS — E53.8 B12 DEFICIENCY: ICD-10-CM

## 2018-04-05 DIAGNOSIS — F33.42 RECURRENT MAJOR DEPRESSIVE DISORDER, IN FULL REMISSION (HCC): ICD-10-CM

## 2018-04-05 DIAGNOSIS — I10 ESSENTIAL HYPERTENSION: ICD-10-CM

## 2018-04-05 DIAGNOSIS — E78.5 HYPERLIPIDEMIA ASSOCIATED WITH TYPE 2 DIABETES MELLITUS (HCC): ICD-10-CM

## 2018-04-05 DIAGNOSIS — M35.9 XEROSTOMIA DUE TO AUTOIMMUNE DISEASE (HCC): ICD-10-CM

## 2018-04-05 DIAGNOSIS — K58.0 IRRITABLE BOWEL SYNDROME WITH DIARRHEA: ICD-10-CM

## 2018-04-05 DIAGNOSIS — L90.0 LICHEN SCLEROSUS: ICD-10-CM

## 2018-04-05 DIAGNOSIS — Z13.820 SCREENING FOR OSTEOPOROSIS: ICD-10-CM

## 2018-04-05 DIAGNOSIS — Z00.00 ENCOUNTER FOR ANNUAL HEALTH EXAMINATION: Primary | ICD-10-CM

## 2018-04-05 DIAGNOSIS — E11.69 HYPERLIPIDEMIA ASSOCIATED WITH TYPE 2 DIABETES MELLITUS (HCC): ICD-10-CM

## 2018-04-05 DIAGNOSIS — K57.30 DIVERTICULOSIS OF LARGE INTESTINE WITHOUT HEMORRHAGE: ICD-10-CM

## 2018-04-05 DIAGNOSIS — Z85.41 HX OF CERVICAL CANCER: ICD-10-CM

## 2018-04-05 DIAGNOSIS — N39.46 MIXED STRESS AND URGE URINARY INCONTINENCE: ICD-10-CM

## 2018-04-05 DIAGNOSIS — F03.90 DEMENTIA WITHOUT BEHAVIORAL DISTURBANCE, UNSPECIFIED DEMENTIA TYPE (HCC): ICD-10-CM

## 2018-04-05 PROBLEM — Z98.890 S/P LEFT KNEE ARTHROSCOPY: Status: RESOLVED | Noted: 2017-02-17 | Resolved: 2018-04-05

## 2018-04-05 PROCEDURE — 96160 PT-FOCUSED HLTH RISK ASSMT: CPT | Performed by: FAMILY MEDICINE

## 2018-04-05 PROCEDURE — G0439 PPPS, SUBSEQ VISIT: HCPCS | Performed by: FAMILY MEDICINE

## 2018-04-05 NOTE — PATIENT INSTRUCTIONS
Obdulia Barker's SCREENING SCHEDULE   Tests on this list are recommended by your physician but may not be covered, or covered at this frequency, by your insurer. Please check with your insurance carrier before scheduling to verify coverage.    TONA only No results found for this or any previous visit.  Limited to patients who meet one of the following criteria:   • Men who are 73-68 years old and have smoked more than 100 cigarettes in their lifetime   • Anyone with a family history    Colorectal Canc as needed after 75 Mammogram,1 Yr due on 02/19/2015 Please get this Mammogram regularly   Immunizations      Influenza  Covered Annually No orders found for this or any previous visit.  Please get every year    Pneumococcal 13 (Prevnar)  Covered Once after to review and print using their home computer and printer. (the forms are also available in [address])  [email or web address]  This link also has information from the [facility] regarding Advance Directives.

## 2018-04-05 NOTE — PROGRESS NOTES
HPI:   Cody Johnson is a 76year old female who presents for a MA (Medicare Advantage) Supervisit (Once per calendar year). DM dx at age 54.   Current medication:  Lantus (started 8/2016) Taking 30 units at night and Glimepiride 2mg BID (increased Then, refresh here to see your input here.  If truly abnormal, document plan in chart below     Functional Ability/Status   Damien Yeung has some abnormal functions as listed below:  She has no issues with dressing and bathing based on screening of [facility] esophagitis     Lichen sclerosus     Hx of cervical cancer     Recurrent major depressive disorder, in full remission ([facility] [address]     Type 2 diabetes mellitus with hyperglycemia, with long-term current use of insulin (HCC)     B12 deficiency     Essential hypert magnesium oxide 400 MG Oral Tab    tacrolimus (PROTOPIC) 0.1 % External Ointment Apply to the affected area BID x 2 weeks   LUBRICANT EYE DROPS 0.4-0.3 % Ophthalmic Solution    Magnesium Oxide 400 (240 Mg) MG Oral Tab    Multiple Vitamins-Minerals (PX SE daily as needed for cough. Vitamin C 500 MG Oral Tab Take 500 mg by mouth daily. Multiple Vitamins-Minerals (ANTIOXIDANT) Oral Tab Take 1 tablet by mouth daily.    Ondansetron HCl (ZOFRAN) 4 mg tablet Take 1 tablet (4 mg total) by mouth daily as needed member; DM in an other family member; Diabetes in her brother; HTN in an other family member; Heart Disease in her mother; Heart Disorder (age of onset: 80) in her mother; Stroke in her mother. SOCIAL HISTORY:   She  reports that she has never smoked.  Sh • Pneumovax 23 05/11/2017   • TDAP 11/05/2013        ASSESSMENT AND OTHER RELEVANT CHRONIC CONDITIONS:   Ghassan Trujillo is a 76year old female who presents for a Medicare Assessment. PLAN SUMMARY:   1. Encounter for annual health examination    2. good     PLAN:  The patient indicates understanding of these issues and agrees to the plan. Reinforced healthy diet, lifestyle, and exercise. Return in 3 months (on 7/5/2018).      Tennille Naqvi MD, 4/5/2018     General Health     In the past six evan Dexascan Every two years Last Dexa Scan:   XR DEXA BONE DENSITOMETRY (CPT=77080) 02/19/2014    No flowsheet data found.     Pap and Pelvic      Pap: Every 3 yrs age 21-65 or Pap+HPV every 5 yrs age 33-67, age 72 and older at high risk There are no preventiv Creatinine  Annually Creatinine, Serum (mg/dL)   Date Value   02/22/2016 0.93     Creatinine (mg/dL)   Date Value   03/28/2018 0.88   10/24/2016 0.89    No flowsheet data found.     Drug Serum Conc  Annually No results found for: DIGOXIN, DIG, VALP No flows

## 2018-04-06 PROBLEM — K11.7 XEROSTOMIA DUE TO AUTOIMMUNE DISEASE (HCC): Status: ACTIVE | Noted: 2018-04-06

## 2018-04-06 PROBLEM — M35.9 XEROSTOMIA DUE TO AUTOIMMUNE DISEASE: Status: ACTIVE | Noted: 2018-04-06

## 2018-04-06 PROBLEM — K11.7 XEROSTOMIA DUE TO AUTOIMMUNE DISEASE: Status: ACTIVE | Noted: 2018-04-06

## 2018-04-06 PROBLEM — J44.9 ASTHMA WITH COPD (CHRONIC OBSTRUCTIVE PULMONARY DISEASE) (HCC): Chronic | Status: ACTIVE | Noted: 2018-04-06

## 2018-04-06 PROBLEM — J44.89 ASTHMA WITH COPD (CHRONIC OBSTRUCTIVE PULMONARY DISEASE): Chronic | Status: ACTIVE | Noted: 2018-04-06

## 2018-04-06 PROBLEM — M35.9 XEROSTOMIA DUE TO AUTOIMMUNE DISEASE (HCC): Status: ACTIVE | Noted: 2018-04-06

## 2018-04-19 ENCOUNTER — OFFICE VISIT (OUTPATIENT)
Dept: OBGYN CLINIC | Facility: CLINIC | Age: 68
End: 2018-04-19

## 2018-04-19 VITALS — BODY MASS INDEX: 28 KG/M2 | WEIGHT: 158 LBS | DIASTOLIC BLOOD PRESSURE: 78 MMHG | SYSTOLIC BLOOD PRESSURE: 150 MMHG

## 2018-04-19 DIAGNOSIS — L90.0 LICHEN SCLEROSUS: Primary | ICD-10-CM

## 2018-04-19 DIAGNOSIS — N39.42 URINARY INCONTINENCE WITHOUT SENSORY AWARENESS: ICD-10-CM

## 2018-04-19 PROCEDURE — 56605 BIOPSY OF VULVA/PERINEUM: CPT | Performed by: OBSTETRICS & GYNECOLOGY

## 2018-04-19 PROCEDURE — 88305 TISSUE EXAM BY PATHOLOGIST: CPT | Performed by: OBSTETRICS & GYNECOLOGY

## 2018-04-19 NOTE — PROGRESS NOTES
Patient is a 76year old here for follow up treatment of known Lichen sclerosis. Last seen one month ago for same and urinary incontinence. Given Vesicare since 3 month trial of Ditropan unsuccessful.  Also had her use Temovate nightly, along with Eucerin a

## 2018-04-20 ENCOUNTER — TELEPHONE (OUTPATIENT)
Dept: OBGYN CLINIC | Facility: CLINIC | Age: 68
End: 2018-04-20

## 2018-04-20 NOTE — TELEPHONE ENCOUNTER
Pt calling and was referred to Dr. Casey Reyes  Ob/Gyn Urology    Pt needs a referral  Code needs to be [phone]    Please advise

## 2018-04-23 ENCOUNTER — PATIENT OUTREACH (OUTPATIENT)
Dept: CASE MANAGEMENT | Age: 68
End: 2018-04-23

## 2018-04-24 RX ORDER — PIMECROLIMUS 10 MG/G
CREAM TOPICAL
Qty: 30 G | Refills: 2 | Status: SHIPPED | OUTPATIENT
Start: 2018-04-24

## 2018-04-24 NOTE — PROGRESS NOTES
Biopsy confirms Lichen sclerosis. Since failing high potency topical steroids, will trial Elidel cream BID. Has appt with Dr Evan Iniguez at end of May. To see me back in follow up after seeing Dr Evan Iniguez.

## 2018-04-30 ENCOUNTER — PATIENT OUTREACH (OUTPATIENT)
Dept: CASE MANAGEMENT | Age: 68
End: 2018-04-30

## 2018-05-08 ENCOUNTER — PATIENT OUTREACH (OUTPATIENT)
Dept: CASE MANAGEMENT | Age: 68
End: 2018-05-08

## 2018-05-08 NOTE — PROGRESS NOTES
Spoke to pt briefly she was on her way to [facility] and said she would call back tomorrow.    Total time spent with patient including chart review: 3  Time spent with patient this month: 3    Total time spent with communication and chart review this month to latasha

## 2018-05-09 ENCOUNTER — PATIENT OUTREACH (OUTPATIENT)
Dept: CASE MANAGEMENT | Age: 68
End: 2018-05-09

## 2018-05-09 DIAGNOSIS — F03.90 DEMENTIA WITHOUT BEHAVIORAL DISTURBANCE, UNSPECIFIED DEMENTIA TYPE (HCC): ICD-10-CM

## 2018-05-09 DIAGNOSIS — Z79.4 TYPE 2 DIABETES MELLITUS WITH HYPERGLYCEMIA, WITH LONG-TERM CURRENT USE OF INSULIN (HCC): ICD-10-CM

## 2018-05-09 DIAGNOSIS — E78.5 HYPERLIPIDEMIA ASSOCIATED WITH TYPE 2 DIABETES MELLITUS (HCC): ICD-10-CM

## 2018-05-09 DIAGNOSIS — F33.42 RECURRENT MAJOR DEPRESSIVE DISORDER, IN FULL REMISSION (HCC): ICD-10-CM

## 2018-05-09 DIAGNOSIS — E11.69 HYPERLIPIDEMIA ASSOCIATED WITH TYPE 2 DIABETES MELLITUS (HCC): ICD-10-CM

## 2018-05-09 DIAGNOSIS — I10 ESSENTIAL HYPERTENSION: ICD-10-CM

## 2018-05-09 DIAGNOSIS — J44.9 ASTHMA WITH COPD (CHRONIC OBSTRUCTIVE PULMONARY DISEASE) (HCC): Chronic | ICD-10-CM

## 2018-05-09 DIAGNOSIS — F31.9 BIPOLAR 1 DISORDER (HCC): ICD-10-CM

## 2018-05-09 DIAGNOSIS — E11.65 TYPE 2 DIABETES MELLITUS WITH HYPERGLYCEMIA, WITH LONG-TERM CURRENT USE OF INSULIN (HCC): ICD-10-CM

## 2018-05-09 PROCEDURE — 99490 CHRNC CARE MGMT STAFF 1ST 20: CPT

## 2018-05-09 NOTE — PROGRESS NOTES
5/9/2018  Spoke to Kaylen for CCM. Updates to patient care team/comments: yes. Patient reported changes in medications: no changes. Med Adherence  Comment: pt reports taking all medications as prescribed. Health Maintenance:    Your appointmen has cut out rice and eats pasta occasionally (like every two weeks). New Goal (if previous met)  Pt reports she loves sewing and use do it everyday but has stopped for over 2 weeks.  She feels like she just can't concentrate as much due to some stres Total: 27 min medical record review, telephone communication, care plan updates where needed, education, goals and action plan recreation/update. Provided acknowledgment and validation to patient's concerns.    Monthly Minute Total including today: 30    Ph

## 2018-05-21 ENCOUNTER — PATIENT OUTREACH (OUTPATIENT)
Dept: CASE MANAGEMENT | Age: 68
End: 2018-05-21

## 2018-05-21 ENCOUNTER — OFFICE VISIT (OUTPATIENT)
Dept: UROLOGY | Facility: HOSPITAL | Age: 68
End: 2018-05-21
Attending: OBSTETRICS & GYNECOLOGY
Payer: MEDICARE

## 2018-05-21 VITALS
HEIGHT: 63.25 IN | WEIGHT: 158 LBS | DIASTOLIC BLOOD PRESSURE: 60 MMHG | SYSTOLIC BLOOD PRESSURE: 142 MMHG | BODY MASS INDEX: 27.65 KG/M2

## 2018-05-21 DIAGNOSIS — N39.41 URGE INCONTINENCE: Primary | ICD-10-CM

## 2018-05-21 PROCEDURE — 99201 HC OUTPT EVAL AND MGNT NEW PT LEVEL 1: CPT

## 2018-05-21 NOTE — PROGRESS NOTES
Spoke to pt reports having her appointmnt today with Dr. Julio Lim and he stated her Lichen Scleroses was severe but is on the right creams. She say's he would like her to a have a bladder test done whih he does in office.  Lenora Ny scheduled her appointment

## 2018-05-30 ENCOUNTER — TELEPHONE (OUTPATIENT)
Dept: OBGYN CLINIC | Facility: CLINIC | Age: 68
End: 2018-05-30

## 2018-05-30 NOTE — TELEPHONE ENCOUNTER
PT may need surgery, and will need 3 additional Referrals for  Dr. Ulloa Floor  CPT Code 63006   Dx Codes N32.81 - N39.3 - N95.2

## 2018-05-31 DIAGNOSIS — E11.65 TYPE 2 DIABETES MELLITUS WITH HYPERGLYCEMIA, WITH LONG-TERM CURRENT USE OF INSULIN (HCC): ICD-10-CM

## 2018-05-31 DIAGNOSIS — Z79.4 TYPE 2 DIABETES MELLITUS WITH HYPERGLYCEMIA, WITH LONG-TERM CURRENT USE OF INSULIN (HCC): ICD-10-CM

## 2018-05-31 NOTE — TELEPHONE ENCOUNTER
Pt is taking Lantus 34 Units daily-pended. She needs a new Rx sent to [facility]. LOV 4/5/18.  Future Appointments  Date Time Provider Elvin Osman   6/6/2018 8:00 AM PROCEDURE [address]   7/17/2018 10:00 AM Jorge Ruelas MD EMG 3 [facility]G

## 2018-06-06 ENCOUNTER — OFFICE VISIT (OUTPATIENT)
Dept: UROLOGY | Facility: HOSPITAL | Age: 68
End: 2018-06-06
Attending: OBSTETRICS & GYNECOLOGY
Payer: MEDICARE

## 2018-06-06 VITALS
BODY MASS INDEX: 27.65 KG/M2 | DIASTOLIC BLOOD PRESSURE: 68 MMHG | HEIGHT: 63.25 IN | SYSTOLIC BLOOD PRESSURE: 104 MMHG | WEIGHT: 158 LBS

## 2018-06-06 DIAGNOSIS — N39.41 URGE INCONTINENCE: Primary | ICD-10-CM

## 2018-06-06 PROCEDURE — 51784 ANAL/URINARY MUSCLE STUDY: CPT

## 2018-06-06 PROCEDURE — 51729 CYSTOMETROGRAM W/VP&UP: CPT

## 2018-06-06 PROCEDURE — 51741 ELECTRO-UROFLOWMETRY FIRST: CPT

## 2018-06-06 PROCEDURE — 51797 INTRAABDOMINAL PRESSURE TEST: CPT

## 2018-06-06 NOTE — PATIENT INSTRUCTIONS
[facility]  [address] [place] [address],[address]  [place], [address] [facility]  Office: [phone]      Urodynamic Testing Discharge Instructions: There are NO dietary or activity restrictions. You may resume your normal schedule.       You may hav routine medications on weekends. No narcotics or controlled substances are refilled after noon on Fridays or by on call physicians. By law, narcotics cannot be faxed or phoned into your pharmacy.  The prescription must be signed by the provider, Liane Roach

## 2018-06-06 NOTE — PROCEDURES
URODYNAMIC EVALUATION  PATIENT HISTORY:  Pt had been taking Vesicare without relief of leakage, stopped Vesicare 2 weeks ago, has not noticd a difference in leakage, c/o always feeling \"wet\", voiding every 15 minutes, nocturia x 4, denies any recent UTI' capsule Rfl: 1   DONEPEZIL HCL 10 MG Oral Tab TAKE 1 TABLET EVERY EVENING Disp: 90 tablet Rfl: 1   LISINOPRIL 10 MG Oral Tab TAKE 1 TABLET TWICE DAILY Disp: 180 tablet Rfl: 1   CITALOPRAM HYDROBROMIDE 40 MG Oral Tab TAKE 1 TABLET EVERY DAY Disp: 90 tablet External Cream Use twice daily to affected area Disp: 60 g Rfl: 3   cyanocobalamin 1000 MCG/ML Injection Solution Inject 1 mL (1,000 mcg total) into the muscle every 30 (thirty) days.  Per Dr. Helder Lemon - See telephone encounter on 08/12/2016 Disp:  Rfl: 0   t leakage?     []  Yes [x]  No  Volume at 1st unhibited detrusor cont:   n/a mL  Detrusor instability provoked by:    []  Spontaneous []  Coughing  []  Filling  []  Heel Bounce  []  Running Water []  Valsalva  []  Hand washing []  Other    Additional Notes:

## 2018-06-10 ENCOUNTER — HOSPITAL ENCOUNTER (OUTPATIENT)
Age: 68
Discharge: HOME | End: 2018-06-10
Attending: FAMILY MEDICINE
Payer: MEDICARE

## 2018-06-10 VITALS
RESPIRATION RATE: 20 BRPM | DIASTOLIC BLOOD PRESSURE: 59 MMHG | BODY MASS INDEX: 27.82 KG/M2 | HEART RATE: 68 BPM | TEMPERATURE: 98 F | WEIGHT: 157 LBS | HEIGHT: 63 IN | OXYGEN SATURATION: 99 % | SYSTOLIC BLOOD PRESSURE: 166 MMHG

## 2018-06-10 DIAGNOSIS — J00 ACUTE NASOPHARYNGITIS: Primary | ICD-10-CM

## 2018-06-10 DIAGNOSIS — J45.21 MILD INTERMITTENT ASTHMA WITH EXACERBATION: ICD-10-CM

## 2018-06-10 PROCEDURE — 94640 AIRWAY INHALATION TREATMENT: CPT

## 2018-06-10 PROCEDURE — 99204 OFFICE O/P NEW MOD 45 MIN: CPT

## 2018-06-10 PROCEDURE — 87081 CULTURE SCREEN ONLY: CPT | Performed by: FAMILY MEDICINE

## 2018-06-10 PROCEDURE — 99214 OFFICE O/P EST MOD 30 MIN: CPT

## 2018-06-10 PROCEDURE — 87430 STREP A AG IA: CPT | Performed by: FAMILY MEDICINE

## 2018-06-10 RX ORDER — BENZONATATE 100 MG/1
100 CAPSULE ORAL 3 TIMES DAILY PRN
Qty: 30 CAPSULE | Refills: 0 | Status: SHIPPED | OUTPATIENT
Start: 2018-06-10 | End: 2018-07-10

## 2018-06-10 RX ORDER — IPRATROPIUM BROMIDE AND ALBUTEROL SULFATE 2.5; .5 MG/3ML; MG/3ML
3 SOLUTION RESPIRATORY (INHALATION) ONCE
Status: COMPLETED | OUTPATIENT
Start: 2018-06-10 | End: 2018-06-10

## 2018-06-10 RX ORDER — PREDNISONE 20 MG/1
20 TABLET ORAL 2 TIMES DAILY
Qty: 14 TABLET | Refills: 0 | Status: SHIPPED | OUTPATIENT
Start: 2018-06-10 | End: 2018-06-17

## 2018-06-10 NOTE — ED INITIAL ASSESSMENT (HPI)
Cold symptoms including productive cough x 4 days. Hx of asthma. Pt states she feels her breathing in labored, last inhaler this morning. Lungs clear on ascultation. Pt also complains of headache, using ibuprofen at home.

## 2018-06-10 NOTE — ED PROVIDER NOTES
Patient Seen in: [facility]    History   Patient presents with:  Cough/URI  Headache (neurologic)    Stated Complaint: ASTHMATIC/CONGESTION X 4 DAYS    HPI    75-year-old female with history of diabetes, allergic rhinitis, hyper ARTHROSCOPY  No date: KNEE REPLACEMENT SURGERY  No date: OTHER      Comment: anal sphincteroplasty   1996: OTHER SURGICAL HISTORY      Comment: Exploratory laparotomy  1994: OTHER SURGICAL HISTORY      Comment: partial gastrectomy/vagotomy  1996: OTHER VICTORINA rhonchi. MDM   Rapid strep is negative. Patient's exam was unremarkable. Symptoms most likely viral.  Patient reassured and systematic treatment was prescribed.         Disposition and Plan     Clinical Impression:  Acute nasopharyngitis  (primary enco

## 2018-06-12 ENCOUNTER — PATIENT OUTREACH (OUTPATIENT)
Dept: CASE MANAGEMENT | Age: 68
End: 2018-06-12

## 2018-06-12 ENCOUNTER — TELEPHONE (OUTPATIENT)
Dept: FAMILY MEDICINE CLINIC | Facility: CLINIC | Age: 68
End: 2018-06-12

## 2018-06-12 DIAGNOSIS — F31.9 BIPOLAR 1 DISORDER (HCC): ICD-10-CM

## 2018-06-12 DIAGNOSIS — E78.5 HYPERLIPIDEMIA ASSOCIATED WITH TYPE 2 DIABETES MELLITUS (HCC): ICD-10-CM

## 2018-06-12 DIAGNOSIS — J44.9 ASTHMA WITH COPD (CHRONIC OBSTRUCTIVE PULMONARY DISEASE) (HCC): Chronic | ICD-10-CM

## 2018-06-12 DIAGNOSIS — E11.65 TYPE 2 DIABETES MELLITUS WITH HYPERGLYCEMIA, WITH LONG-TERM CURRENT USE OF INSULIN (HCC): ICD-10-CM

## 2018-06-12 DIAGNOSIS — Z85.41 HX OF CERVICAL CANCER: ICD-10-CM

## 2018-06-12 DIAGNOSIS — F03.90 DEMENTIA WITHOUT BEHAVIORAL DISTURBANCE, UNSPECIFIED DEMENTIA TYPE (HCC): ICD-10-CM

## 2018-06-12 DIAGNOSIS — F33.42 RECURRENT MAJOR DEPRESSIVE DISORDER, IN FULL REMISSION (HCC): ICD-10-CM

## 2018-06-12 DIAGNOSIS — E11.69 HYPERLIPIDEMIA ASSOCIATED WITH TYPE 2 DIABETES MELLITUS (HCC): ICD-10-CM

## 2018-06-12 DIAGNOSIS — Z79.4 TYPE 2 DIABETES MELLITUS WITH HYPERGLYCEMIA, WITH LONG-TERM CURRENT USE OF INSULIN (HCC): ICD-10-CM

## 2018-06-12 DIAGNOSIS — I10 ESSENTIAL HYPERTENSION: ICD-10-CM

## 2018-06-12 NOTE — TELEPHONE ENCOUNTER
Called and told patient plan from Dr Eleanor Cristina she will do this and call us back on Friday with the readings

## 2018-06-12 NOTE — PROGRESS NOTES
6/12/2018  Spoke to Kaylen for CCM. Updates to patient care team/comments: n/a. Patient reported changes in medications: no changes. Med Adherence  Comment: pt reports taking all medications as prescribed. Health Maintenance:    Your appointme is feeling better and her breathing has been good. Pt also reports having the test done that Dr. Eladia Hernandez ordered and she will be following up with him in mid July to discuss dates for procedure.    Tc Wen did have to postpone her vacation couple days due to gett

## 2018-06-12 NOTE — TELEPHONE ENCOUNTER
Triage: Good Afternoon :)    Pt states she thinks her blood pressure has been high (had a reading couple days ago) and before she leaves on vacation would like Dr. Kaleb Peralta advise.  Notified I would contact Dr. Kaleb Peralta (triage) office and nurse will call h

## 2018-06-12 NOTE — TELEPHONE ENCOUNTER
Pt reports her BP was 156/65 today. 166/59 on Pito 6/10. Has been taking medication regularly. Has been getting headaches. Denies dizziness. Pt wondering if there are any changes she needs to make to her medication?  She will be going out of town for 3 we

## 2018-06-18 ENCOUNTER — TELEPHONE (OUTPATIENT)
Dept: FAMILY MEDICINE CLINIC | Facility: CLINIC | Age: 68
End: 2018-06-18

## 2018-06-18 DIAGNOSIS — I10 ESSENTIAL HYPERTENSION: Primary | ICD-10-CM

## 2018-06-18 RX ORDER — LISINOPRIL 30 MG/1
30 TABLET ORAL DAILY
Qty: 30 TABLET | Refills: 2 | Status: SHIPPED | OUTPATIENT
Start: 2018-06-18 | End: 2018-09-09

## 2018-06-18 NOTE — TELEPHONE ENCOUNTER
Pt on lisinopril 10mg BID, can increase to lisinopril 30mg once daily. Okay for new script.  Send readings in a few days

## 2018-06-18 NOTE — TELEPHONE ENCOUNTER
Wed 155/60 Th 156/55 fri 167/72 Sa 150/71 has constant headache wants get better before Saturday she is traveling to [facility].

## 2018-06-18 NOTE — TELEPHONE ENCOUNTER
Called and talked to patient informed her of new dosage and to keep track of BP and call Friday with results she agreed to do this

## 2018-06-22 ENCOUNTER — TELEPHONE (OUTPATIENT)
Dept: FAMILY MEDICINE CLINIC | Facility: CLINIC | Age: 68
End: 2018-06-22

## 2018-06-22 NOTE — TELEPHONE ENCOUNTER
Reviewed Dr. Gauri Tripp recommendations with Pt. Pt states feeling okay, slight headache. Pt going to [facility] next week and will be back in [place] on 7/18/18. Pt has appt 7/24/18 with Dr. Gauri Tripp.   Advised Pt to continue medication, push fluids and keep BP

## 2018-06-22 NOTE — TELEPHONE ENCOUNTER
Some improvement, but may take a little more time. Is she feeling better? Plan to continue this dose for another few weeks, if still not controlled, can increase to 40mg. Have her f/u when she is back in town.

## 2018-06-22 NOTE — TELEPHONE ENCOUNTER
Patient was told to call with BP readings    Mon 161/62-before she started taking medicine  Tues 156/70  Wed 142/67  Thurs 149/73  Fri 154/74

## 2018-06-30 PROCEDURE — 99490 CHRNC CARE MGMT STAFF 1ST 20: CPT

## 2018-07-10 RX ORDER — BUPROPION HYDROCHLORIDE 300 MG/1
TABLET ORAL
Qty: 90 TABLET | Refills: 0 | Status: SHIPPED | OUTPATIENT
Start: 2018-07-10 | End: 2018-11-13

## 2018-07-10 NOTE — TELEPHONE ENCOUNTER
Refill request sent from pharmacy for Bupropion. LOV 04/05/18. Will you approve refill? Routed to Dr Eleanor Cristina.

## 2018-07-12 ENCOUNTER — PATIENT OUTREACH (OUTPATIENT)
Dept: CASE MANAGEMENT | Age: 68
End: 2018-07-12

## 2018-07-12 DIAGNOSIS — J44.9 ASTHMA WITH COPD (CHRONIC OBSTRUCTIVE PULMONARY DISEASE) (HCC): Chronic | ICD-10-CM

## 2018-07-12 DIAGNOSIS — Z85.41 HX OF CERVICAL CANCER: ICD-10-CM

## 2018-07-12 DIAGNOSIS — F31.9 BIPOLAR 1 DISORDER (HCC): ICD-10-CM

## 2018-07-12 DIAGNOSIS — I10 ESSENTIAL HYPERTENSION: ICD-10-CM

## 2018-07-12 DIAGNOSIS — E78.5 HYPERLIPIDEMIA ASSOCIATED WITH TYPE 2 DIABETES MELLITUS (HCC): ICD-10-CM

## 2018-07-12 DIAGNOSIS — E11.69 HYPERLIPIDEMIA ASSOCIATED WITH TYPE 2 DIABETES MELLITUS (HCC): ICD-10-CM

## 2018-07-12 DIAGNOSIS — E11.65 TYPE 2 DIABETES MELLITUS WITH HYPERGLYCEMIA, WITH LONG-TERM CURRENT USE OF INSULIN (HCC): ICD-10-CM

## 2018-07-12 DIAGNOSIS — F03.90 DEMENTIA WITHOUT BEHAVIORAL DISTURBANCE, UNSPECIFIED DEMENTIA TYPE (HCC): ICD-10-CM

## 2018-07-12 DIAGNOSIS — F33.42 RECURRENT MAJOR DEPRESSIVE DISORDER, IN FULL REMISSION (HCC): ICD-10-CM

## 2018-07-12 DIAGNOSIS — Z79.4 TYPE 2 DIABETES MELLITUS WITH HYPERGLYCEMIA, WITH LONG-TERM CURRENT USE OF INSULIN (HCC): ICD-10-CM

## 2018-07-12 NOTE — PROGRESS NOTES
7/12/2018  Spoke to Kaylen for CCM. Updates to patient care team/comments: n/a. Patient reported changes in medications: pt done with cough medication. Medication list updated.    Med Adherence  Comment: pt reports taking all medications as prescribe Seeing Dr. Rosa Craig next week and she will know her surgery date then pt states she is still in pain and discomfort but dealing with it. Using the creams prescribed. Pt states she can't wait to get this procedure to get some relieve.      · My ongoing goal fo

## 2018-07-20 ENCOUNTER — OFFICE VISIT (OUTPATIENT)
Dept: UROLOGY | Facility: HOSPITAL | Age: 68
End: 2018-07-20
Attending: OBSTETRICS & GYNECOLOGY
Payer: MEDICARE

## 2018-07-20 ENCOUNTER — TELEPHONE (OUTPATIENT)
Dept: FAMILY MEDICINE CLINIC | Facility: CLINIC | Age: 68
End: 2018-07-20

## 2018-07-20 VITALS
BODY MASS INDEX: 27.82 KG/M2 | DIASTOLIC BLOOD PRESSURE: 70 MMHG | WEIGHT: 157 LBS | SYSTOLIC BLOOD PRESSURE: 128 MMHG | HEIGHT: 63 IN

## 2018-07-20 DIAGNOSIS — N39.41 URGE INCONTINENCE: ICD-10-CM

## 2018-07-20 DIAGNOSIS — N39.3 FEMALE STRESS INCONTINENCE: Primary | ICD-10-CM

## 2018-07-20 PROCEDURE — 99211 OFF/OP EST MAY X REQ PHY/QHP: CPT

## 2018-07-20 NOTE — PROGRESS NOTES
Pyridium not covered. Called pt and advised her to use AZO. Pt verbalized an understanding and agreed to plan.

## 2018-07-20 NOTE — TELEPHONE ENCOUNTER
Received fax from [facility]. For surgery clearance needed for pt with Dr. Alicia Aguilar for Placement mid-urethril sling cyutscopy. CBC, ECG needed. Paperwork in triage.

## 2018-07-24 ENCOUNTER — APPOINTMENT (OUTPATIENT)
Dept: LAB | Age: 68
End: 2018-07-24
Attending: FAMILY MEDICINE
Payer: MEDICARE

## 2018-07-24 ENCOUNTER — OFFICE VISIT (OUTPATIENT)
Dept: FAMILY MEDICINE CLINIC | Facility: CLINIC | Age: 68
End: 2018-07-24

## 2018-07-24 VITALS
DIASTOLIC BLOOD PRESSURE: 80 MMHG | WEIGHT: 159 LBS | HEART RATE: 72 BPM | TEMPERATURE: 99 F | BODY MASS INDEX: 27.14 KG/M2 | RESPIRATION RATE: 16 BRPM | SYSTOLIC BLOOD PRESSURE: 130 MMHG | HEIGHT: 64 IN

## 2018-07-24 DIAGNOSIS — Z01.818 PREOP TESTING: ICD-10-CM

## 2018-07-24 DIAGNOSIS — N39.46 MIXED STRESS AND URGE URINARY INCONTINENCE: ICD-10-CM

## 2018-07-24 DIAGNOSIS — Z01.810 PREOP CARDIOVASCULAR EXAM: ICD-10-CM

## 2018-07-24 DIAGNOSIS — E11.65 TYPE 2 DIABETES MELLITUS WITH HYPERGLYCEMIA, WITH LONG-TERM CURRENT USE OF INSULIN (HCC): ICD-10-CM

## 2018-07-24 DIAGNOSIS — J44.9 ASTHMA WITH COPD (CHRONIC OBSTRUCTIVE PULMONARY DISEASE) (HCC): Chronic | ICD-10-CM

## 2018-07-24 DIAGNOSIS — E11.69 HYPERLIPIDEMIA ASSOCIATED WITH TYPE 2 DIABETES MELLITUS (HCC): ICD-10-CM

## 2018-07-24 DIAGNOSIS — F31.9 BIPOLAR 1 DISORDER (HCC): ICD-10-CM

## 2018-07-24 DIAGNOSIS — E78.5 HYPERLIPIDEMIA ASSOCIATED WITH TYPE 2 DIABETES MELLITUS (HCC): ICD-10-CM

## 2018-07-24 DIAGNOSIS — Z79.4 TYPE 2 DIABETES MELLITUS WITH HYPERGLYCEMIA, WITH LONG-TERM CURRENT USE OF INSULIN (HCC): ICD-10-CM

## 2018-07-24 DIAGNOSIS — I10 ESSENTIAL HYPERTENSION: ICD-10-CM

## 2018-07-24 DIAGNOSIS — R01.1 MURMUR, CARDIAC: ICD-10-CM

## 2018-07-24 DIAGNOSIS — Z01.818 PREOP EXAMINATION: Primary | ICD-10-CM

## 2018-07-24 LAB
CARTRIDGE LOT#: ABNORMAL NUMERIC
ERYTHROCYTE [DISTWIDTH] IN BLOOD BY AUTOMATED COUNT: 15.7 % (ref 11.5–16)
HCT VFR BLD AUTO: 41.2 % (ref 34–50)
HEMOGLOBIN A1C: 7.9 % (ref 4.3–5.6)
HGB BLD-MCNC: 13.6 G/DL (ref 12–16)
MCH RBC QN AUTO: 28.6 PG (ref 27–33.2)
MCHC RBC AUTO-ENTMCNC: 33 G/DL (ref 31–37)
MCV RBC AUTO: 86.6 FL (ref 81–100)
PLATELET # BLD AUTO: 279 10(3)UL (ref 150–450)
RBC # BLD AUTO: 4.76 X10(6)UL (ref 3.8–5.1)
RED CELL DISTRIBUTION WIDTH-SD: 49.7 FL (ref 35.1–46.3)
WBC # BLD AUTO: 7.5 X10(3) UL (ref 4–13)

## 2018-07-24 PROCEDURE — 99214 OFFICE O/P EST MOD 30 MIN: CPT | Performed by: FAMILY MEDICINE

## 2018-07-24 PROCEDURE — 93000 ELECTROCARDIOGRAM COMPLETE: CPT | Performed by: FAMILY MEDICINE

## 2018-07-24 PROCEDURE — 83036 HEMOGLOBIN GLYCOSYLATED A1C: CPT | Performed by: FAMILY MEDICINE

## 2018-07-24 PROCEDURE — 85027 COMPLETE CBC AUTOMATED: CPT

## 2018-07-24 NOTE — PROGRESS NOTES
Ericka Nash is a 76year old female who presents for a pre-operative physical exam. Patient is to have urethral sling surgery, to be done by Dr. Ayon Co at [address] on 8/6/18. Pt has had previous anesthesia:  Yes.   Previous complications:  No  Hx of DVT/ (eight) hours as needed for Pain.  Disp: 30 tablet Rfl: 0   Cholecalciferol (VITAMIN D3) 5000 units Oral Cap  Disp:  Rfl:    tacrolimus (PROTOPIC) 0.1 % External Ointment Apply to the affected area BID x 2 weeks Disp: 60 g Rfl: 2   LUBRICANT EYE DROPS 0.4-0 Take 1 tablet (7.5 mg total) by mouth 3 (three) times daily. Disp: 90 tablet Rfl: 1   Vitamin C 500 MG Oral Tab Take 500 mg by mouth daily. Disp:  Rfl:    Multiple Vitamins-Minerals (ANTIOXIDANT) Oral Tab Take 1 tablet by mouth daily.  Disp:  Rfl:    Aryan bowel obstruction (HCC)    • Type II or unspecified type diabetes mellitus without mention of complication, not stated as uncontrolled    • Unspecified essential hypertension    • Urogenital disorder     atrophy   • Urticaria     trunk, extremities, face adenopathy  LUNGS: clear to auscultation  CARDIO: RRR; + murmur  GI: good BS's,no masses, HSM or tenderness  EXTREMITIES: no edema  NEURO: Alert    ASSESSMENT AND PLAN:   Мария Kothari is a 76year old female who presents for a pre-operative physical ex

## 2018-07-25 NOTE — TELEPHONE ENCOUNTER
Dr Jonas Monreal faxed over Pre-Op information to [facility] but Box needs to be checked and could not find EKG?  Form in your INBOX

## 2018-07-30 ENCOUNTER — TELEPHONE (OUTPATIENT)
Dept: FAMILY MEDICINE CLINIC | Facility: CLINIC | Age: 68
End: 2018-07-30

## 2018-07-30 ENCOUNTER — LAB ENCOUNTER (OUTPATIENT)
Dept: LAB | Age: 68
End: 2018-07-30
Attending: FAMILY MEDICINE
Payer: MEDICARE

## 2018-07-30 ENCOUNTER — PATIENT OUTREACH (OUTPATIENT)
Dept: CASE MANAGEMENT | Age: 68
End: 2018-07-30

## 2018-07-30 DIAGNOSIS — Z01.818 PRE-OP EXAM: ICD-10-CM

## 2018-07-30 DIAGNOSIS — Z01.818 PRE-OP EXAM: Primary | ICD-10-CM

## 2018-07-30 LAB
ANION GAP SERPL CALC-SCNC: 8 MMOL/L (ref 0–18)
BUN BLD-MCNC: 18 MG/DL (ref 8–20)
BUN/CREAT SERPL: 20.5 (ref 10–20)
CALCIUM BLD-MCNC: 9.4 MG/DL (ref 8.3–10.3)
CHLORIDE SERPL-SCNC: 104 MMOL/L (ref 101–111)
CO2 SERPL-SCNC: 27 MMOL/L (ref 22–32)
CREAT BLD-MCNC: 0.88 MG/DL (ref 0.55–1.02)
GLUCOSE BLD-MCNC: 190 MG/DL (ref 70–99)
OSMOLALITY SERPL CALC.SUM OF ELEC: 295 MOSM/KG (ref 275–295)
POTASSIUM SERPL-SCNC: 4.2 MMOL/L (ref 3.6–5.1)
SODIUM SERPL-SCNC: 139 MMOL/L (ref 136–144)

## 2018-07-30 PROCEDURE — 80048 BASIC METABOLIC PNL TOTAL CA: CPT

## 2018-07-30 PROCEDURE — 36415 COLL VENOUS BLD VENIPUNCTURE: CPT

## 2018-07-30 NOTE — PROGRESS NOTES
Returned pt's call from earlier stated she has already had things taken care of by Dr. Perla Hinkle office. Pt relates she is having her procedure done next week.  Also states she has scheduled an appointment to see therapist at [facility] in Oct.   Total time

## 2018-07-30 NOTE — TELEPHONE ENCOUNTER
See other telephone note. Dr Tamika Landers authorized orders. Pt already had Pre- Op appt on 07/24/18.

## 2018-07-30 NOTE — TELEPHONE ENCOUNTER
Placed bmp order per Dr Radha Paulino. Called pt to inform her order has been placed.   Pt verbalized agreement

## 2018-07-31 PROCEDURE — 99490 CHRNC CARE MGMT STAFF 1ST 20: CPT

## 2018-08-02 ENCOUNTER — ANESTHESIA EVENT (OUTPATIENT)
Dept: SURGERY | Facility: HOSPITAL | Age: 68
End: 2018-08-02
Payer: MEDICARE

## 2018-08-06 ENCOUNTER — HOSPITAL ENCOUNTER (OUTPATIENT)
Facility: HOSPITAL | Age: 68
Setting detail: HOSPITAL OUTPATIENT SURGERY
Discharge: HOME | End: 2018-08-06
Attending: OBSTETRICS & GYNECOLOGY | Admitting: OBSTETRICS & GYNECOLOGY
Payer: MEDICARE

## 2018-08-06 ENCOUNTER — ANESTHESIA (OUTPATIENT)
Dept: SURGERY | Facility: HOSPITAL | Age: 68
End: 2018-08-06
Payer: MEDICARE

## 2018-08-06 ENCOUNTER — SURGERY (OUTPATIENT)
Age: 68
End: 2018-08-06

## 2018-08-06 VITALS
OXYGEN SATURATION: 97 % | TEMPERATURE: 99 F | RESPIRATION RATE: 16 BRPM | BODY MASS INDEX: 28.17 KG/M2 | WEIGHT: 159 LBS | HEART RATE: 75 BPM | HEIGHT: 63 IN | SYSTOLIC BLOOD PRESSURE: 132 MMHG | DIASTOLIC BLOOD PRESSURE: 58 MMHG

## 2018-08-06 PROBLEM — N39.3 SUI (STRESS URINARY INCONTINENCE, FEMALE): Status: ACTIVE | Noted: 2018-08-06

## 2018-08-06 LAB
GLUCOSE BLD-MCNC: 135 MG/DL (ref 65–99)
GLUCOSE BLD-MCNC: 98 MG/DL (ref 65–99)

## 2018-08-06 PROCEDURE — 0TSD0ZZ REPOSITION URETHRA, OPEN APPROACH: ICD-10-PCS | Performed by: OBSTETRICS & GYNECOLOGY

## 2018-08-06 PROCEDURE — 82962 GLUCOSE BLOOD TEST: CPT

## 2018-08-06 DEVICE — TRANSVAGINAL MID-URETHRAL SLING
Type: IMPLANTABLE DEVICE | Site: BLADDER | Status: FUNCTIONAL
Brand: ADVANTAGE FIT™  SYSTEM

## 2018-08-06 RX ORDER — MORPHINE SULFATE 4 MG/ML
INJECTION, SOLUTION INTRAMUSCULAR; INTRAVENOUS
Status: COMPLETED
Start: 2018-08-06 | End: 2018-08-06

## 2018-08-06 RX ORDER — ACETAMINOPHEN 500 MG
1000 TABLET ORAL ONCE
Status: DISCONTINUED | OUTPATIENT
Start: 2018-08-06 | End: 2018-08-06

## 2018-08-06 RX ORDER — DEXTROSE MONOHYDRATE 25 G/50ML
50 INJECTION, SOLUTION INTRAVENOUS
Status: DISCONTINUED | OUTPATIENT
Start: 2018-08-06 | End: 2018-08-06 | Stop reason: HOSPADM

## 2018-08-06 RX ORDER — DEXTROSE MONOHYDRATE 25 G/50ML
50 INJECTION, SOLUTION INTRAVENOUS
Status: DISCONTINUED | OUTPATIENT
Start: 2018-08-06 | End: 2018-08-06

## 2018-08-06 RX ORDER — MORPHINE SULFATE 4 MG/ML
2 INJECTION, SOLUTION INTRAMUSCULAR; INTRAVENOUS EVERY 5 MIN PRN
Status: DISCONTINUED | OUTPATIENT
Start: 2018-08-06 | End: 2018-08-06

## 2018-08-06 RX ORDER — TRAMADOL HYDROCHLORIDE 50 MG/1
50 TABLET ORAL EVERY 6 HOURS PRN
Qty: 28 TABLET | Refills: 2 | Status: SHIPPED
Start: 2018-08-06

## 2018-08-06 RX ORDER — NALOXONE HYDROCHLORIDE 0.4 MG/ML
80 INJECTION, SOLUTION INTRAMUSCULAR; INTRAVENOUS; SUBCUTANEOUS AS NEEDED
Status: DISCONTINUED | OUTPATIENT
Start: 2018-08-06 | End: 2018-08-06

## 2018-08-06 RX ORDER — TRAMADOL HYDROCHLORIDE 50 MG/1
50 TABLET ORAL ONCE
Status: COMPLETED | OUTPATIENT
Start: 2018-08-06 | End: 2018-08-06

## 2018-08-06 RX ORDER — SODIUM CHLORIDE, SODIUM LACTATE, POTASSIUM CHLORIDE, CALCIUM CHLORIDE 600; 310; 30; 20 MG/100ML; MG/100ML; MG/100ML; MG/100ML
INJECTION, SOLUTION INTRAVENOUS CONTINUOUS
Status: DISCONTINUED | OUTPATIENT
Start: 2018-08-06 | End: 2018-08-06

## 2018-08-06 RX ORDER — TRISODIUM CITRATE DIHYDRATE AND CITRIC ACID MONOHYDRATE 500; 334 MG/5ML; MG/5ML
30 SOLUTION ORAL ONCE
Status: COMPLETED | OUTPATIENT
Start: 2018-08-06 | End: 2018-08-06

## 2018-08-06 RX ORDER — TRISODIUM CITRATE DIHYDRATE AND CITRIC ACID MONOHYDRATE 500; 334 MG/5ML; MG/5ML
SOLUTION ORAL
Status: COMPLETED
Start: 2018-08-06 | End: 2018-08-06

## 2018-08-06 RX ORDER — CEFAZOLIN SODIUM/WATER 2 G/20 ML
2 SYRINGE (ML) INTRAVENOUS ONCE
Status: COMPLETED | OUTPATIENT
Start: 2018-08-06 | End: 2018-08-06

## 2018-08-06 RX ORDER — INSULIN ASPART 100 [IU]/ML
INJECTION, SOLUTION INTRAVENOUS; SUBCUTANEOUS ONCE
Status: DISCONTINUED | OUTPATIENT
Start: 2018-08-06 | End: 2018-08-06

## 2018-08-06 RX ORDER — IBUPROFEN 600 MG/1
600 TABLET ORAL EVERY 6 HOURS
Qty: 40 TABLET | Refills: 2 | Status: SHIPPED
Start: 2018-08-06

## 2018-08-06 NOTE — ANESTHESIA POSTPROCEDURE EVALUATION
[address] 61 Patient Status:  Hospital Outpatient Surgery   Age/Gender 76year old female MRN [id number]   [facility] SUR[facility]ERY Attending Sandra Penaloza MD   Hosp Day # 0 PCP Mae Diaz MD       Anesthesia Post-op N

## 2018-08-06 NOTE — H&P
[address] [facility] Patient Status:  Hospital Outpatient Surgery    1950 MRN [id number]   [facility] PRE OP HOLDING Attending Yani Friedman MD   Hosp Day # 0 PCP Jaqueline Moffett MD     Date of Comment: partial gastrectomy/vagotomy  1996: OTHER SURGICAL HISTORY      Comment: feeding tube  No date: OTHER SURGICAL HISTORY      Comment: bowel resection  No date: TONSILLECTOMY  1/20/14: UPPER GI ENDOSCOPY,EXAM      Comment: grade 1 esophagitis, Yessi Jose (240 Mg) MG Oral Tab 2 (two) times daily.    Disp:  Rfl:  Past Month at Unknown time   Multiple Vitamins-Minerals (PX SENIOR VITAMIN OR)  Disp:  Rfl:  Past Month at Unknown time   MIRTAZAPINE 30 MG Oral Tab TAKE 1 TABLET EVERY EVENING Disp: 90 tablet Rfl: 1 Propionate 0.05 % External Cream Use twice daily to affected area Disp: 60 g Rfl: 3 Past Week at Unknown time   triamcinolone acetonide (KENALOG) 0.1 % Apply Externally Cream Apply  topically 2 (two) times daily.  Disp:  Rfl:  Taking   Pimecrolimus 1 % Exte rate and rhythm. No murmur. Abdomen:  Soft, non-distended, non-tender, with no rebound or guarding. No peritoneal signs. No ascites. Liver is within normal limits. Spleen is not palpable. Extremities:  No lower extremity edema noted.   Without clubbi

## 2018-08-06 NOTE — OPERATIVE REPORT
Pre-Operative Diagnosis: Stress Incontinence    Post-Operative Diagnosis: Same.     Procedure Performed:  Midurethral sling; cystourethroscopy    Surgeon:  Kathy Souza MD    Anesthesia: General    EBL: 25 ml    Complications: None    Specimen:  None    Th

## 2018-08-06 NOTE — ANESTHESIA PREPROCEDURE EVALUATION
PRE-OP EVALUATION    Patient Name: Kristina Curiel    Pre-op Diagnosis: STRESS INCONTINENCE      Procedure(s):  PLACEMENT OF MIDURETHREL SLING, CYSTOSCOPY      Surgeon(s) and Role:     * Arturo Alcaraz MD - Primary    Pre-op vitals reviewed.         Body 1   GLIMEPIRIDE 2 MG Oral Tab TAKE 1 TABLET TWICE DAILY Disp: 180 tablet Rfl: 1   Albuterol Sulfate  (90 Base) MCG/ACT Inhalation Aero Soln Inhale 2 puffs into the lungs every 6 (six) hours as needed for Wheezing.  Disp:  Rfl:    PANTOPRAZOLE SODIUM asthma  (+) COPD                   Neuro/Psych                     Psychiatric history: bipolar.          Bipolar   Sjogrens      Past Surgical History:  1996: APPENDECTOMY  No date: CHOLECYSTECTOMY  1/20/14: COLONOSCOPY      Comment: colon polyp, diverticu management modalities,transfusion if needed, etc. Questions answered. Accepts. Plan/risks discussed with: patient and child/children  Use of blood product(s) discussed with: patient and child/children    Consented to blood products.           Present on

## 2018-08-15 ENCOUNTER — PATIENT OUTREACH (OUTPATIENT)
Dept: CASE MANAGEMENT | Age: 68
End: 2018-08-15

## 2018-08-15 DIAGNOSIS — E78.5 HYPERLIPIDEMIA ASSOCIATED WITH TYPE 2 DIABETES MELLITUS (HCC): ICD-10-CM

## 2018-08-15 DIAGNOSIS — J44.9 ASTHMA WITH COPD (CHRONIC OBSTRUCTIVE PULMONARY DISEASE) (HCC): Chronic | ICD-10-CM

## 2018-08-15 DIAGNOSIS — F31.9 BIPOLAR 1 DISORDER (HCC): ICD-10-CM

## 2018-08-15 DIAGNOSIS — Z85.41 HX OF CERVICAL CANCER: ICD-10-CM

## 2018-08-15 DIAGNOSIS — F03.90 DEMENTIA WITHOUT BEHAVIORAL DISTURBANCE, UNSPECIFIED DEMENTIA TYPE (HCC): ICD-10-CM

## 2018-08-15 DIAGNOSIS — F33.42 RECURRENT MAJOR DEPRESSIVE DISORDER, IN FULL REMISSION (HCC): ICD-10-CM

## 2018-08-15 DIAGNOSIS — E11.65 TYPE 2 DIABETES MELLITUS WITH HYPERGLYCEMIA, WITH LONG-TERM CURRENT USE OF INSULIN (HCC): ICD-10-CM

## 2018-08-15 DIAGNOSIS — E11.69 HYPERLIPIDEMIA ASSOCIATED WITH TYPE 2 DIABETES MELLITUS (HCC): ICD-10-CM

## 2018-08-15 DIAGNOSIS — Z79.4 TYPE 2 DIABETES MELLITUS WITH HYPERGLYCEMIA, WITH LONG-TERM CURRENT USE OF INSULIN (HCC): ICD-10-CM

## 2018-08-15 DIAGNOSIS — I10 ESSENTIAL HYPERTENSION: ICD-10-CM

## 2018-08-15 NOTE — PROGRESS NOTES
8/15/2018  Spoke to Kaylen for CCM. Updates to patient care team/comments: n/a. Patient reported changes in medications: pt reports Prednisone therapy finished. Medication list updated.    Med Adherence  Comment: pt reports taking all medications as Encounter Total: 20 min medical record review, telephone communication, care plan updates where needed, education, goals and action plan recreation/update. Provided acknowledgment and validation to patient's concerns.    Monthly Minute Total including today

## 2018-08-21 ENCOUNTER — HOSPITAL ENCOUNTER (OUTPATIENT)
Dept: BONE DENSITY | Age: 68
Discharge: HOME | End: 2018-08-21
Attending: FAMILY MEDICINE
Payer: MEDICARE

## 2018-08-21 ENCOUNTER — HOSPITAL ENCOUNTER (OUTPATIENT)
Dept: MAMMOGRAPHY | Age: 68
Discharge: HOME | End: 2018-08-21
Attending: FAMILY MEDICINE
Payer: MEDICARE

## 2018-08-21 DIAGNOSIS — Z13.820 SCREENING FOR OSTEOPOROSIS: ICD-10-CM

## 2018-08-21 DIAGNOSIS — Z12.31 BREAST CANCER SCREENING BY MAMMOGRAM: ICD-10-CM

## 2018-08-21 PROCEDURE — 77080 DXA BONE DENSITY AXIAL: CPT | Performed by: FAMILY MEDICINE

## 2018-08-21 PROCEDURE — 77067 SCR MAMMO BI INCL CAD: CPT | Performed by: FAMILY MEDICINE

## 2018-08-21 PROCEDURE — 77063 BREAST TOMOSYNTHESIS BI: CPT | Performed by: FAMILY MEDICINE

## 2018-08-23 ENCOUNTER — HOSPITAL ENCOUNTER (OUTPATIENT)
Dept: CV DIAGNOSTICS | Facility: HOSPITAL | Age: 68
Discharge: HOME | End: 2018-08-23
Attending: FAMILY MEDICINE
Payer: MEDICARE

## 2018-08-23 DIAGNOSIS — R01.1 MURMUR, CARDIAC: ICD-10-CM

## 2018-08-23 PROCEDURE — 93306 TTE W/DOPPLER COMPLETE: CPT | Performed by: FAMILY MEDICINE

## 2018-08-29 PROBLEM — I35.0 AORTIC STENOSIS, MILD: Status: ACTIVE | Noted: 2018-08-29

## 2018-08-31 PROCEDURE — 99490 CHRNC CARE MGMT STAFF 1ST 20: CPT

## 2018-09-09 DIAGNOSIS — I10 ESSENTIAL HYPERTENSION: ICD-10-CM

## 2018-09-10 RX ORDER — LISINOPRIL 30 MG/1
TABLET ORAL
Qty: 30 TABLET | Refills: 1 | Status: SHIPPED | OUTPATIENT
Start: 2018-09-10 | End: 2018-10-10

## 2018-09-10 NOTE — TELEPHONE ENCOUNTER
Medication refilled per protocol.      Requested Prescriptions     Signed Prescriptions Disp Refills   • LISINOPRIL 30 MG Oral Tab 30 tablet 1     Sig: TAKE 1 TABLET(30 MG) BY MOUTH DAILY     Authorizing Provider: Vickie Luis     Ordering User: Naima Carlisle

## 2018-09-17 ENCOUNTER — TELEPHONE (OUTPATIENT)
Dept: FAMILY MEDICINE CLINIC | Facility: CLINIC | Age: 68
End: 2018-09-17

## 2018-09-17 ENCOUNTER — PATIENT OUTREACH (OUTPATIENT)
Dept: CASE MANAGEMENT | Age: 68
End: 2018-09-17

## 2018-09-17 DIAGNOSIS — I10 ESSENTIAL HYPERTENSION: ICD-10-CM

## 2018-09-17 DIAGNOSIS — F31.9 BIPOLAR 1 DISORDER (HCC): ICD-10-CM

## 2018-09-17 DIAGNOSIS — F03.90 DEMENTIA WITHOUT BEHAVIORAL DISTURBANCE, UNSPECIFIED DEMENTIA TYPE (HCC): ICD-10-CM

## 2018-09-17 DIAGNOSIS — E78.5 HYPERLIPIDEMIA ASSOCIATED WITH TYPE 2 DIABETES MELLITUS (HCC): ICD-10-CM

## 2018-09-17 DIAGNOSIS — E11.65 TYPE 2 DIABETES MELLITUS WITH HYPERGLYCEMIA, WITH LONG-TERM CURRENT USE OF INSULIN (HCC): ICD-10-CM

## 2018-09-17 DIAGNOSIS — Z79.4 TYPE 2 DIABETES MELLITUS WITH HYPERGLYCEMIA, WITH LONG-TERM CURRENT USE OF INSULIN (HCC): ICD-10-CM

## 2018-09-17 DIAGNOSIS — E11.69 HYPERLIPIDEMIA ASSOCIATED WITH TYPE 2 DIABETES MELLITUS (HCC): ICD-10-CM

## 2018-09-17 DIAGNOSIS — Z85.41 HX OF CERVICAL CANCER: ICD-10-CM

## 2018-09-17 DIAGNOSIS — F33.42 RECURRENT MAJOR DEPRESSIVE DISORDER, IN FULL REMISSION (HCC): ICD-10-CM

## 2018-09-17 DIAGNOSIS — J44.9 ASTHMA WITH COPD (CHRONIC OBSTRUCTIVE PULMONARY DISEASE) (HCC): Chronic | ICD-10-CM

## 2018-09-17 DIAGNOSIS — M79.672 FOOT PAIN, LEFT: Primary | ICD-10-CM

## 2018-09-17 NOTE — PROGRESS NOTES
9/17/2018  Spoke to Kaylen for CCM. Updates to patient care team/comments: n/a. Patient reported changes in medications: no changes. Med Adherence  Comment: pt reports taking all medications as prescribed. Health Maintenance:    Your appointm walking or standing for long periods. [facility] or surrounding area. Notified pt I would contact Dr. Guadarrama Rosanky office (triage) and nurse would call her back for further assessment. Understanding verbalized by pt.   Telephone encounter

## 2018-09-17 NOTE — TELEPHONE ENCOUNTER
Triage: Good Afternoon :)     Pt wondering if Dr. Jimmy Anaya can order referrals for Podiatrist states her left foot last two digits has been giving her some pain usually only when walking. Rheumatologist both in [facility] or surrounding area.   Notified pt I

## 2018-09-17 NOTE — TELEPHONE ENCOUNTER
Pt is having some pain in left foot 4th-5th toes. No discoloration, no temperature change. Pt states pain is noticed when standing on feet a lot. Pt is a Diabetic.  Pt denies seeing Podiatry in the past, but is requesting referral to see a Podiatrist, alice

## 2018-09-18 NOTE — TELEPHONE ENCOUNTER
Referral request Dr. Shannan Anthony DPM    Patient last seen by Dr. Vishal Evans M.D. 7/24/2018  Office notes from Dr. Michael Bernard 7/24/2018  Type 2 diabetes mellitus with hyperglycemia, with long-term current use of insulin (HCC)  A1c stable/slightly impr

## 2018-09-19 ENCOUNTER — OFFICE VISIT (OUTPATIENT)
Dept: UROLOGY | Facility: HOSPITAL | Age: 68
End: 2018-09-19
Attending: OBSTETRICS & GYNECOLOGY
Payer: MEDICARE

## 2018-09-19 VITALS
SYSTOLIC BLOOD PRESSURE: 118 MMHG | WEIGHT: 159 LBS | DIASTOLIC BLOOD PRESSURE: 78 MMHG | HEIGHT: 63 IN | BODY MASS INDEX: 28.17 KG/M2

## 2018-09-19 DIAGNOSIS — N39.41 URGE INCONTINENCE OF URINE: ICD-10-CM

## 2018-09-19 DIAGNOSIS — Z98.890 POST-OPERATIVE STATE: Primary | ICD-10-CM

## 2018-09-19 PROCEDURE — 99211 OFF/OP EST MAY X REQ PHY/QHP: CPT

## 2018-09-19 RX ORDER — SOLIFENACIN SUCCINATE 10 MG/1
10 TABLET, FILM COATED ORAL DAILY
Qty: 90 TABLET | Refills: 1 | Status: SHIPPED | OUTPATIENT
Start: 2018-09-19 | End: 2019-01-11

## 2018-09-19 NOTE — PROGRESS NOTES
Patient will call in one month after starting Vesicare 10 mg daily - if no results will consider PTNS - Information provided to patient on this

## 2018-09-19 NOTE — PATIENT INSTRUCTIONS
Call in one month after taking Vesicare 10 mg daily - if no improvement - may consider PTNS   Phone number for nurse line [phone]

## 2018-09-25 ENCOUNTER — HOSPITAL ENCOUNTER (OUTPATIENT)
Age: 68
Discharge: HOME | End: 2018-09-25
Payer: MEDICARE

## 2018-09-25 ENCOUNTER — APPOINTMENT (OUTPATIENT)
Dept: GENERAL RADIOLOGY | Age: 68
End: 2018-09-25
Attending: PHYSICIAN ASSISTANT
Payer: MEDICARE

## 2018-09-25 VITALS
DIASTOLIC BLOOD PRESSURE: 72 MMHG | OXYGEN SATURATION: 97 % | TEMPERATURE: 98 F | RESPIRATION RATE: 1 BRPM | HEART RATE: 90 BPM | SYSTOLIC BLOOD PRESSURE: 155 MMHG

## 2018-09-25 DIAGNOSIS — J20.9 ACUTE BRONCHITIS, UNSPECIFIED ORGANISM: Primary | ICD-10-CM

## 2018-09-25 PROCEDURE — 99214 OFFICE O/P EST MOD 30 MIN: CPT

## 2018-09-25 PROCEDURE — 94640 AIRWAY INHALATION TREATMENT: CPT

## 2018-09-25 PROCEDURE — 71046 X-RAY EXAM CHEST 2 VIEWS: CPT | Performed by: PHYSICIAN ASSISTANT

## 2018-09-25 RX ORDER — ALBUTEROL SULFATE 2.5 MG/3ML
2.5 SOLUTION RESPIRATORY (INHALATION) EVERY 4 HOURS PRN
Qty: 30 AMPULE | Refills: 0 | Status: SHIPPED | OUTPATIENT
Start: 2018-09-25 | End: 2018-10-25

## 2018-09-25 RX ORDER — OSELTAMIVIR PHOSPHATE 75 MG/1
75 CAPSULE ORAL 2 TIMES DAILY
Qty: 10 CAPSULE | Refills: 0 | Status: SHIPPED | OUTPATIENT
Start: 2018-09-25 | End: 2018-09-30

## 2018-09-25 RX ORDER — AZITHROMYCIN 250 MG/1
TABLET, FILM COATED ORAL
Qty: 1 PACKAGE | Refills: 0 | Status: SHIPPED | OUTPATIENT
Start: 2018-09-25 | End: 2018-09-30

## 2018-09-25 RX ORDER — IPRATROPIUM BROMIDE AND ALBUTEROL SULFATE 2.5; .5 MG/3ML; MG/3ML
3 SOLUTION RESPIRATORY (INHALATION) ONCE
Status: COMPLETED | OUTPATIENT
Start: 2018-09-25 | End: 2018-09-25

## 2018-09-25 NOTE — ED INITIAL ASSESSMENT (HPI)
CC: cough with chest congestion, chills and body aches for 5 days. Taking left over Benzonatate. With clear nasal discharges and post nasal drip. Denies short of breath.   Hx of Pneumonia twice last year

## 2018-09-25 NOTE — ED PROVIDER NOTES
Patient Seen in: [address] [facility]    History   Patient presents with:  Cough/URI    Stated Complaint: chills, congestion, body aches, x5 days    HPI    Zeina Oneill is a 71-year-old female who presents today for evaluation of congestion, coug essential hypertension  No date: Urogenital disorder      Comment:  atrophy  No date: Urticaria      Comment:  trunk, extremities, face  No date: Visual impairment      Comment:  glasses    Past Surgical History:  1996: APPENDECTOMY  8/6/2018: BLADDER SPRAGUE oriented to person, place, and time. She appears well-developed and well-nourished. No distress. HENT:   Head: Normocephalic and atraumatic.    Right Ear: Tympanic membrane and external ear normal.   Left Ear: Tympanic membrane and external ear normal. Comments:              chills, congestion, body aches, x5 days CC: cough with chest congestion, chills and body aches for 5 days. Taking               left over Benzonatate. With clear nasal discharges and post nasal drip.                Denies short of shannan instructions are given and discussed. Discharge medications are discussed. The patient is in good condition throughout the visit today and remains so upon discharge. I discuss the plan of care with the patient, who expresses understanding.   All questions

## 2018-09-30 PROCEDURE — 99490 CHRNC CARE MGMT STAFF 1ST 20: CPT

## 2018-10-04 ENCOUNTER — OFFICE VISIT (OUTPATIENT)
Dept: PODIATRY CLINIC | Facility: CLINIC | Age: 68
End: 2018-10-04

## 2018-10-04 ENCOUNTER — HOSPITAL ENCOUNTER (OUTPATIENT)
Dept: GENERAL RADIOLOGY | Age: 68
Discharge: HOME | End: 2018-10-04
Attending: PODIATRIST
Payer: MEDICARE

## 2018-10-04 VITALS — DIASTOLIC BLOOD PRESSURE: 60 MMHG | HEART RATE: 68 BPM | SYSTOLIC BLOOD PRESSURE: 160 MMHG

## 2018-10-04 DIAGNOSIS — D36.13 NEUROMA OF FOOT: ICD-10-CM

## 2018-10-04 DIAGNOSIS — D36.13 NEUROMA OF FOOT: Primary | ICD-10-CM

## 2018-10-04 PROCEDURE — 73630 X-RAY EXAM OF FOOT: CPT | Performed by: PODIATRIST

## 2018-10-04 PROCEDURE — 99203 OFFICE O/P NEW LOW 30 MIN: CPT | Performed by: PODIATRIST

## 2018-10-04 PROCEDURE — 64455 NJX AA&/STRD PLTR COM DG NRV: CPT | Performed by: PODIATRIST

## 2018-10-04 RX ORDER — TRIAMCINOLONE ACETONIDE 40 MG/ML
20 INJECTION, SUSPENSION INTRA-ARTICULAR; INTRAMUSCULAR ONCE
Status: COMPLETED | OUTPATIENT
Start: 2018-10-04 | End: 2018-10-04

## 2018-10-04 RX ADMIN — TRIAMCINOLONE ACETONIDE 20 MG: 40 INJECTION, SUSPENSION INTRA-ARTICULAR; INTRAMUSCULAR at 12:10:00

## 2018-10-04 NOTE — PROGRESS NOTES
Per Dr. Sofia Proctor draw up 0.5cc Marcaine 0.5% and 0.5cc Kenalog 40 for injection to left foot.     [address]

## 2018-10-05 ENCOUNTER — OFFICE VISIT (OUTPATIENT)
Dept: FAMILY MEDICINE CLINIC | Facility: CLINIC | Age: 68
End: 2018-10-05

## 2018-10-05 VITALS
TEMPERATURE: 99 F | BODY MASS INDEX: 28.17 KG/M2 | SYSTOLIC BLOOD PRESSURE: 128 MMHG | WEIGHT: 159 LBS | HEART RATE: 84 BPM | DIASTOLIC BLOOD PRESSURE: 70 MMHG | RESPIRATION RATE: 16 BRPM | HEIGHT: 63 IN

## 2018-10-05 DIAGNOSIS — M25.542 ARTHRALGIA OF BOTH HANDS: ICD-10-CM

## 2018-10-05 DIAGNOSIS — J44.9 ASTHMA WITH COPD (CHRONIC OBSTRUCTIVE PULMONARY DISEASE) (HCC): Chronic | ICD-10-CM

## 2018-10-05 DIAGNOSIS — M25.541 ARTHRALGIA OF BOTH HANDS: ICD-10-CM

## 2018-10-05 DIAGNOSIS — F33.42 RECURRENT MAJOR DEPRESSIVE DISORDER, IN FULL REMISSION (HCC): ICD-10-CM

## 2018-10-05 DIAGNOSIS — M35.00 SJOGREN'S SYNDROME, WITH UNSPECIFIED ORGAN INVOLVEMENT (HCC): Primary | ICD-10-CM

## 2018-10-05 PROCEDURE — 99214 OFFICE O/P EST MOD 30 MIN: CPT | Performed by: FAMILY MEDICINE

## 2018-10-05 RX ORDER — DIAZEPAM 5 MG/1
5 TABLET ORAL DAILY PRN
Qty: 30 TABLET | Refills: 2 | Status: SHIPPED | OUTPATIENT
Start: 2018-10-05 | End: 2018-12-11

## 2018-10-05 NOTE — PROGRESS NOTES
HPI:   Damien Yeung is a 76year old female. Was seen last week in ER and given abx for bronchitis and tamiflu for possibly influenza. Has completed both, feeling better. Still just has mild cough. Needs diazepam refilled, using 3-4x/week.   Feel 0.1 % External Ointment Apply to the affected area BID x 2 weeks Disp: 60 g Rfl: 2   LUBRICANT EYE DROPS 0.4-0.3 % Ophthalmic Solution  Disp:  Rfl:    Magnesium Oxide 400 (240 Mg) MG Oral Tab 2 (two) times daily.    Disp:  Rfl:    Multiple Vitamins-Minerals External Cream Use twice daily to affected area Disp: 60 g Rfl: 3   triamcinolone acetonide (KENALOG) 0.1 % Apply Externally Cream Apply  topically 2 (two) times daily.  Disp:  Rfl:      REVIEW OF SYSTEMS:   GENERAL: feels well otherwise, no fever  HEENT: n

## 2018-10-08 NOTE — PROGRESS NOTES
Yojana Lee is a 76year old female.  Patient presents with:  Diabetic Foot Care: pain to left 4th and 5th toes 6/10, numbness to left great toe, did not check bs today, 7/27 A1C 7.9        HPI:   This very pleasant 71-year-old female patient presents Ointment Apply to the affected area BID x 2 weeks Disp: 60 g Rfl: 2   LUBRICANT EYE DROPS 0.4-0.3 % Ophthalmic Solution  Disp:  Rfl:    Magnesium Oxide 400 (240 Mg) MG Oral Tab 2 (two) times daily.    Disp:  Rfl:    Multiple Vitamins-Minerals (PX SENIOR VIT twice daily to affected area Disp: 60 g Rfl: 3   triamcinolone acetonide (KENALOG) 0.1 % Apply Externally Cream Apply  topically 2 (two) times daily.  Disp:  Rfl:    diazepam (VALIUM) 5 MG Oral Tab Take 1 tablet (5 mg total) by mouth daily as needed for Anx sphincteroplasty   1996: OTHER SURGICAL HISTORY      Comment:  Exploratory laparotomy  1994: OTHER SURGICAL HISTORY      Comment:  partial gastrectomy/vagotomy  1996: OTHER SURGICAL HISTORY      Comment:  feeding tube  No date: OTHER SURGICAL HISTORY Care: Not Asked        Exercise:  Yes          regular        Bike Helmet: Not Asked        Seat Belt: Yes        Self-Exams: Yes    Social History Narrative      Not on file          REVIEW OF SYSTEMS:   Review of Systems  GENERAL HEALTH: feels well otherw feel the neuroma is probably causing most of her discomfort is easily palpable within the third intermetatarsal space of her left foot today consented her for a cortisone injection into that area which was administered using aseptic technique and appropria

## 2018-10-10 DIAGNOSIS — I10 ESSENTIAL HYPERTENSION: ICD-10-CM

## 2018-10-11 RX ORDER — LISINOPRIL 30 MG/1
TABLET ORAL
Qty: 90 TABLET | Refills: 0 | Status: SHIPPED | OUTPATIENT
Start: 2018-10-11 | End: 2018-11-13

## 2018-10-11 NOTE — TELEPHONE ENCOUNTER
Medication refilled per protocol.      Requested Prescriptions     Signed Prescriptions Disp Refills   • LISINOPRIL 30 MG Oral Tab 90 tablet 0     Sig: TAKE 1 TABLET(30 MG) BY MOUTH DAILY     Authorizing Provider: Harsh Barnard     Ordering User: Karla Lindquist

## 2018-10-17 DIAGNOSIS — I10 ESSENTIAL HYPERTENSION: ICD-10-CM

## 2018-10-17 NOTE — TELEPHONE ENCOUNTER
LOV 10/5/2018     Future Appointments   Date Time Provider Elvin Osman   10/18/2018 10:00 AM Avinash Rebolledo MD [facility] 3 [facility]   10/25/2018 11:00 AM Tiffani Zhu DPM [id number] EC N Camelia JOHNSON   11/13/2018 10:00 AM CARLEE Nelson [facility], [facility]

## 2018-10-18 ENCOUNTER — OFFICE VISIT (OUTPATIENT)
Dept: FAMILY MEDICINE CLINIC | Facility: CLINIC | Age: 68
End: 2018-10-18

## 2018-10-18 VITALS
HEART RATE: 60 BPM | RESPIRATION RATE: 16 BRPM | BODY MASS INDEX: 28 KG/M2 | TEMPERATURE: 98 F | HEIGHT: 63 IN | WEIGHT: 158 LBS | SYSTOLIC BLOOD PRESSURE: 120 MMHG | DIASTOLIC BLOOD PRESSURE: 60 MMHG

## 2018-10-18 DIAGNOSIS — Z12.11 COLON CANCER SCREENING: ICD-10-CM

## 2018-10-18 DIAGNOSIS — E11.65 TYPE 2 DIABETES MELLITUS WITH HYPERGLYCEMIA, WITH LONG-TERM CURRENT USE OF INSULIN (HCC): Primary | ICD-10-CM

## 2018-10-18 DIAGNOSIS — Z79.4 TYPE 2 DIABETES MELLITUS WITH HYPERGLYCEMIA, WITH LONG-TERM CURRENT USE OF INSULIN (HCC): Primary | ICD-10-CM

## 2018-10-18 DIAGNOSIS — E53.8 B12 DEFICIENCY: ICD-10-CM

## 2018-10-18 PROCEDURE — 90653 IIV ADJUVANT VACCINE IM: CPT | Performed by: FAMILY MEDICINE

## 2018-10-18 PROCEDURE — 83036 HEMOGLOBIN GLYCOSYLATED A1C: CPT | Performed by: FAMILY MEDICINE

## 2018-10-18 PROCEDURE — G0008 ADMIN INFLUENZA VIRUS VAC: HCPCS | Performed by: FAMILY MEDICINE

## 2018-10-18 PROCEDURE — 99214 OFFICE O/P EST MOD 30 MIN: CPT | Performed by: FAMILY MEDICINE

## 2018-10-18 RX ORDER — ATORVASTATIN CALCIUM 10 MG/1
TABLET, FILM COATED ORAL
Qty: 90 TABLET | Refills: 1 | Status: SHIPPED | OUTPATIENT
Start: 2018-10-18 | End: 2019-03-04

## 2018-10-18 RX ORDER — DILTIAZEM HYDROCHLORIDE 360 MG/1
CAPSULE, EXTENDED RELEASE ORAL
Qty: 90 CAPSULE | Refills: 1 | Status: SHIPPED | OUTPATIENT
Start: 2018-10-18 | End: 2019-04-04

## 2018-10-18 RX ORDER — DONEPEZIL HYDROCHLORIDE 10 MG/1
TABLET, FILM COATED ORAL
Qty: 90 TABLET | Refills: 1 | Status: SHIPPED | OUTPATIENT
Start: 2018-10-18 | End: 2018-12-11

## 2018-10-18 RX ORDER — GLIMEPIRIDE 2 MG/1
TABLET ORAL
Qty: 180 TABLET | Refills: 1 | Status: SHIPPED | OUTPATIENT
Start: 2018-10-18 | End: 2019-04-04

## 2018-10-18 NOTE — PROGRESS NOTES
Senait Wiggins is a 76year old female. HPI:   Patient presents for a medication follow up. DM dx at age 54. Current medication:  Lantus (started 2016) Taking 34 units at night and Glimepiride 2mg BID.   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

## 2018-10-23 ENCOUNTER — PATIENT OUTREACH (OUTPATIENT)
Dept: CASE MANAGEMENT | Age: 68
End: 2018-10-23

## 2018-10-23 DIAGNOSIS — E11.65 TYPE 2 DIABETES MELLITUS WITH HYPERGLYCEMIA, WITH LONG-TERM CURRENT USE OF INSULIN (HCC): ICD-10-CM

## 2018-10-23 DIAGNOSIS — F31.9 BIPOLAR 1 DISORDER (HCC): ICD-10-CM

## 2018-10-23 DIAGNOSIS — I10 ESSENTIAL HYPERTENSION: ICD-10-CM

## 2018-10-23 DIAGNOSIS — F03.90 DEMENTIA WITHOUT BEHAVIORAL DISTURBANCE, UNSPECIFIED DEMENTIA TYPE (HCC): ICD-10-CM

## 2018-10-23 DIAGNOSIS — E78.5 HYPERLIPIDEMIA ASSOCIATED WITH TYPE 2 DIABETES MELLITUS (HCC): ICD-10-CM

## 2018-10-23 DIAGNOSIS — F33.42 RECURRENT MAJOR DEPRESSIVE DISORDER, IN FULL REMISSION (HCC): ICD-10-CM

## 2018-10-23 DIAGNOSIS — E11.69 HYPERLIPIDEMIA ASSOCIATED WITH TYPE 2 DIABETES MELLITUS (HCC): ICD-10-CM

## 2018-10-23 DIAGNOSIS — Z85.41 HX OF CERVICAL CANCER: ICD-10-CM

## 2018-10-23 DIAGNOSIS — Z79.4 TYPE 2 DIABETES MELLITUS WITH HYPERGLYCEMIA, WITH LONG-TERM CURRENT USE OF INSULIN (HCC): ICD-10-CM

## 2018-10-23 DIAGNOSIS — J44.9 ASTHMA WITH COPD (CHRONIC OBSTRUCTIVE PULMONARY DISEASE) (HCC): Chronic | ICD-10-CM

## 2018-10-24 NOTE — PROGRESS NOTES
10/23/2018  Spoke to Kaylen for CCM. Updates to patient care team/comments: yes, Higinio Zavala DPM PODIATRIST Mettu, Swetha Hummel MD GASTROENTEROLOGY, Kayla Juan DO RHEUMATOLOGY. Patient reported changes in medications: no changes.    Med Ad everyday I encouraged and discussed the importance of checking her blood sugars everyday. Understanding verbalized by pt. She will follow up with Dr. Humaira Singh in 3 months and have lab recheck as well.   Suggested pt keep journal of blood sugar readings so s MD.

## 2018-10-30 ENCOUNTER — TELEPHONE (OUTPATIENT)
Dept: FAMILY MEDICINE CLINIC | Facility: CLINIC | Age: 68
End: 2018-10-30

## 2018-10-30 DIAGNOSIS — M79.672 LEFT FOOT PAIN: Primary | ICD-10-CM

## 2018-10-30 NOTE — TELEPHONE ENCOUNTER
Referral request Dr. Rebekah Adhikari, [facility]    Patient last seen by Dr. Sofy Montoya 10/4/18  Office notes from Dr. Sofy Montoya, [address] 10/4/18  Plan:  Today educated the patient about the problem I feel the neuroma is probably causing most of her discomfort is easily pa

## 2018-10-31 PROCEDURE — 99490 CHRNC CARE MGMT STAFF 1ST 20: CPT

## 2018-11-01 ENCOUNTER — APPOINTMENT (OUTPATIENT)
Dept: LAB | Age: 68
End: 2018-11-01
Attending: FAMILY MEDICINE
Payer: MEDICARE

## 2018-11-01 DIAGNOSIS — E53.8 B12 DEFICIENCY: ICD-10-CM

## 2018-11-01 PROCEDURE — 82607 VITAMIN B-12: CPT

## 2018-11-01 PROCEDURE — 36415 COLL VENOUS BLD VENIPUNCTURE: CPT

## 2018-11-03 DIAGNOSIS — I10 ESSENTIAL HYPERTENSION: ICD-10-CM

## 2018-11-06 ENCOUNTER — TELEPHONE (OUTPATIENT)
Dept: UROLOGY | Facility: HOSPITAL | Age: 68
End: 2018-11-06

## 2018-11-06 RX ORDER — LISINOPRIL 30 MG/1
TABLET ORAL
Qty: 30 TABLET | Refills: 1 | Status: SHIPPED | OUTPATIENT
Start: 2018-11-06 | End: 2019-02-07

## 2018-11-06 NOTE — TELEPHONE ENCOUNTER
Patient called has to reschedule PTNS for tomorrow. In the next couple days patient having surgery for bowel obstruction or hernia. Patient states will call to reschedule when feeling better.

## 2018-11-09 ENCOUNTER — HOSPITAL ENCOUNTER (OUTPATIENT)
Dept: GENERAL RADIOLOGY | Facility: HOSPITAL | Age: 68
Discharge: HOME | End: 2018-11-09
Attending: STUDENT IN AN ORGANIZED HEALTH CARE EDUCATION/TRAINING PROGRAM
Payer: MEDICARE

## 2018-11-09 DIAGNOSIS — Z87.19 HISTORY OF SMALL BOWEL OBSTRUCTION: ICD-10-CM

## 2018-11-09 DIAGNOSIS — K21.9 GASTROESOPHAGEAL REFLUX DISEASE WITHOUT ESOPHAGITIS: ICD-10-CM

## 2018-11-09 DIAGNOSIS — R14.0 BLOATING: ICD-10-CM

## 2018-11-09 PROCEDURE — 74245 XR UPPER GI TRACT + SMALL BOWEL (CPT=74245): CPT | Performed by: STUDENT IN AN ORGANIZED HEALTH CARE EDUCATION/TRAINING PROGRAM

## 2018-11-19 PROCEDURE — 88305 TISSUE EXAM BY PATHOLOGIST: CPT | Performed by: STUDENT IN AN ORGANIZED HEALTH CARE EDUCATION/TRAINING PROGRAM

## 2018-11-27 ENCOUNTER — PATIENT OUTREACH (OUTPATIENT)
Dept: CASE MANAGEMENT | Age: 68
End: 2018-11-27

## 2018-11-27 DIAGNOSIS — F03.90 DEMENTIA WITHOUT BEHAVIORAL DISTURBANCE, UNSPECIFIED DEMENTIA TYPE (HCC): ICD-10-CM

## 2018-11-27 DIAGNOSIS — E78.5 HYPERLIPIDEMIA ASSOCIATED WITH TYPE 2 DIABETES MELLITUS (HCC): ICD-10-CM

## 2018-11-27 DIAGNOSIS — Z79.4 TYPE 2 DIABETES MELLITUS WITH HYPERGLYCEMIA, WITH LONG-TERM CURRENT USE OF INSULIN (HCC): ICD-10-CM

## 2018-11-27 DIAGNOSIS — F33.42 RECURRENT MAJOR DEPRESSIVE DISORDER, IN FULL REMISSION (HCC): ICD-10-CM

## 2018-11-27 DIAGNOSIS — I10 ESSENTIAL HYPERTENSION: ICD-10-CM

## 2018-11-27 DIAGNOSIS — E11.65 TYPE 2 DIABETES MELLITUS WITH HYPERGLYCEMIA, WITH LONG-TERM CURRENT USE OF INSULIN (HCC): ICD-10-CM

## 2018-11-27 DIAGNOSIS — F31.9 BIPOLAR 1 DISORDER (HCC): ICD-10-CM

## 2018-11-27 DIAGNOSIS — Z85.41 HX OF CERVICAL CANCER: ICD-10-CM

## 2018-11-27 DIAGNOSIS — E11.69 HYPERLIPIDEMIA ASSOCIATED WITH TYPE 2 DIABETES MELLITUS (HCC): ICD-10-CM

## 2018-11-27 DIAGNOSIS — J44.9 ASTHMA WITH COPD (CHRONIC OBSTRUCTIVE PULMONARY DISEASE) (HCC): Chronic | ICD-10-CM

## 2018-11-27 NOTE — PROGRESS NOTES
11/27/2018  Spoke to Kaylen for CCM. Updates to patient care team/comments: n/a. Patient reported changes in medications: yes, but pt relates list is updated and it will change because her Physiatrist is adjusting some of her medication.    Med Adher relates she has been so busy with all of her other medical conditions. Stopped volunteering at [employer] for now due to pain limiting her. Hard to stand for long periods of time. Care Manager Follow Up: in 1 month.    Reason For Follow Up: re

## 2018-11-30 PROCEDURE — 99490 CHRNC CARE MGMT STAFF 1ST 20: CPT

## 2018-12-05 ENCOUNTER — PATIENT OUTREACH (OUTPATIENT)
Dept: CASE MANAGEMENT | Age: 68
End: 2018-12-05

## 2018-12-05 NOTE — PROGRESS NOTES
Attempted to return pt's call no answer left detailed message for pt to call back.    Total time spent with patient including chart review: 2  Time spent with patient this month: 2    Total time spent with communication and chart review this month to date:

## 2018-12-14 ENCOUNTER — TELEPHONE (OUTPATIENT)
Dept: UROLOGY | Facility: HOSPITAL | Age: 68
End: 2018-12-14

## 2018-12-14 NOTE — TELEPHONE ENCOUNTER
Pt called, has had some health issues and is ready to start PTNS after the holidays, first aot set for Jan 17th, will check referral status and will need to extend. .current referral expires 1/25/19.  Pt aware, apts set

## 2018-12-15 ENCOUNTER — OFFICE VISIT (OUTPATIENT)
Dept: PODIATRY CLINIC | Facility: CLINIC | Age: 68
End: 2018-12-15

## 2018-12-15 VITALS — DIASTOLIC BLOOD PRESSURE: 70 MMHG | HEART RATE: 72 BPM | RESPIRATION RATE: 20 BRPM | SYSTOLIC BLOOD PRESSURE: 135 MMHG

## 2018-12-15 DIAGNOSIS — G57.82 NEUROMA OF THIRD INTERSPACE OF LEFT FOOT: Primary | ICD-10-CM

## 2018-12-15 PROCEDURE — 64455 NJX AA&/STRD PLTR COM DG NRV: CPT | Performed by: PODIATRIST

## 2018-12-15 RX ORDER — POLYETHYLENE GLYCOL-3350 AND ELECTROLYTES 236; 6.74; 5.86; 2.97; 22.74 G/274.31G; G/274.31G; G/274.31G; G/274.31G; G/274.31G
POWDER, FOR SOLUTION ORAL
Refills: 0 | COMMUNITY
Start: 2018-12-03 | End: 2019-01-23 | Stop reason: ALTCHOICE

## 2018-12-15 RX ORDER — TRIAMCINOLONE ACETONIDE 40 MG/ML
20 INJECTION, SUSPENSION INTRA-ARTICULAR; INTRAMUSCULAR ONCE
Status: COMPLETED | OUTPATIENT
Start: 2018-12-15 | End: 2018-12-15

## 2018-12-15 RX ADMIN — TRIAMCINOLONE ACETONIDE 20 MG: 40 INJECTION, SUSPENSION INTRA-ARTICULAR; INTRAMUSCULAR at 11:47:00

## 2018-12-15 NOTE — PROGRESS NOTES
Per Dr. Nataly York, draw up 1/2 cc of 0.5 % Marcaine and 1/2 cc of Kenalog 40 for injection to left foot.  RR

## 2018-12-18 NOTE — PROGRESS NOTES
Senait Wiggins is a 76year old female. Patient presents with: Foot Pain: left -- States cortisone only helped for 1 weeks. Rates pain 3/10 at this time. HPI:   Patient presents to the clinic today still having pain.   Allergies: Biocin [Aspirin- Take 1 tablet (10 mg total) by mouth daily. Disp: 90 tablet Rfl: 1   TraMADol HCl 50 MG Oral Tab Take 1 tablet (50 mg total) by mouth every 6 (six) hours as needed for Pain.  Disp: 28 tablet Rfl: 2   ibuprofen 600 MG Oral Tab Take 1 tablet (600 mg total) by mouth daily. Disp:  Rfl:    Ondansetron HCl (ZOFRAN) 4 mg tablet Take 1 tablet (4 mg total) by mouth daily as needed for Nausea.  Disp: 90 tablet Rfl: 0   Clobetasol Propionate 0.05 % External Cream Use twice daily to affected area Disp: 60 g Rfl: 3   triam Urogenital disorder     atrophy   • Urticaria     trunk, extremities, face   • Visual impairment     glasses   • Vomiting    • Wears glasses       Past Surgical History:   Procedure Laterality Date   • APPENDECTOMY  1996   • APPENDECTOMY     • BLADDER SPRAGUE Smoker      Smokeless tobacco: Never Used    Substance and Sexual Activity      Alcohol use: No        Alcohol/week: 0.0 oz      Drug use: No    Other Topics      Concerns:        Caffeine Concern: Yes          1 cup daily and 4-5  diet coke daily        E

## 2018-12-19 ENCOUNTER — PATIENT OUTREACH (OUTPATIENT)
Dept: CASE MANAGEMENT | Age: 68
End: 2018-12-19

## 2018-12-19 DIAGNOSIS — I10 ESSENTIAL HYPERTENSION: ICD-10-CM

## 2018-12-19 DIAGNOSIS — F33.42 RECURRENT MAJOR DEPRESSIVE DISORDER, IN FULL REMISSION (HCC): ICD-10-CM

## 2018-12-19 DIAGNOSIS — J44.9 ASTHMA WITH COPD (CHRONIC OBSTRUCTIVE PULMONARY DISEASE) (HCC): Chronic | ICD-10-CM

## 2018-12-19 DIAGNOSIS — E78.5 HYPERLIPIDEMIA ASSOCIATED WITH TYPE 2 DIABETES MELLITUS (HCC): ICD-10-CM

## 2018-12-19 DIAGNOSIS — F03.90 DEMENTIA WITHOUT BEHAVIORAL DISTURBANCE, UNSPECIFIED DEMENTIA TYPE (HCC): ICD-10-CM

## 2018-12-19 DIAGNOSIS — Z85.41 HX OF CERVICAL CANCER: ICD-10-CM

## 2018-12-19 DIAGNOSIS — E11.69 HYPERLIPIDEMIA ASSOCIATED WITH TYPE 2 DIABETES MELLITUS (HCC): ICD-10-CM

## 2018-12-19 DIAGNOSIS — E11.65 TYPE 2 DIABETES MELLITUS WITH HYPERGLYCEMIA, WITH LONG-TERM CURRENT USE OF INSULIN (HCC): ICD-10-CM

## 2018-12-19 DIAGNOSIS — Z79.4 TYPE 2 DIABETES MELLITUS WITH HYPERGLYCEMIA, WITH LONG-TERM CURRENT USE OF INSULIN (HCC): ICD-10-CM

## 2018-12-19 DIAGNOSIS — F31.9 BIPOLAR 1 DISORDER (HCC): ICD-10-CM

## 2018-12-19 NOTE — PROGRESS NOTES
12/19/2018  Spoke to Kaylen for CCM. Updates to patient care team/comments: n/a. Patient reported changes in medications: no changes. Med Adherence  Comment: pt reports taking all medications as prescribed. Health Maintenance:    Your Appoin then located in the Medical Building #2, fourth floor, [address]. Make sure that you have your pre-op/follow-up appointment scheduled with your doctor after your test is done,  before you leave the office.    Thursday January 31, 2019  9:00 AM CST  Uro Urg GUILLERMINA RN  [facility] for [facility] Medicine [facility]3]) [address] [facility]  [address]  [address] [id number]   PLEASE NOTE:  As of March 14th, 2018 we have moved to [address] [place] [address],[address]Th Floor in [address] [place] [address] your pre-op/follow-up appointment scheduled with your doctor after your test is done,  before you leave the office.    Thursday March 07, 2019  9:00 AM CST  Uro Urgent PC/PTNS with EDW RN  [address] for Pelvic Medicine [facility] free  service is available on the first floor of the garage. Our office is then located in the Medical Building #2, fourth floor, [address].   Make sure that you have your pre-op/follow-up appointment scheduled with your doctor after your test is done, but it has to be pureed states she eats a lot of oatmeal and it has helped her stomach cramps.   They are going to try to stimulate the bladder she will have to wear something like a needle on her ankle for several weeks that will send elector magnetic wave

## 2018-12-27 ENCOUNTER — OFFICE VISIT (OUTPATIENT)
Dept: PODIATRY CLINIC | Facility: CLINIC | Age: 68
End: 2018-12-27

## 2018-12-27 DIAGNOSIS — G57.82 NEUROMA OF THIRD INTERSPACE OF LEFT FOOT: Primary | ICD-10-CM

## 2018-12-27 DIAGNOSIS — D36.13 NEUROMA OF FOOT: ICD-10-CM

## 2018-12-27 PROCEDURE — 99213 OFFICE O/P EST LOW 20 MIN: CPT | Performed by: PODIATRIST

## 2018-12-31 PROCEDURE — 99490 CHRNC CARE MGMT STAFF 1ST 20: CPT

## 2019-01-01 NOTE — PROGRESS NOTES
Julisa Barrientos is a 76year old female. Patient presents with: Foot Pain: Pt is her5e for left foot pain. Pt did get second steroid injection. Pain continues. Pain level 3. Pain level up to 7 when she is on her feet a lot.    Diabetic Foot Care: Pt's bloo Take 1 tablet (20 mg total) by mouth 3 (three) times daily as needed.  Disp: 90 tablet Rfl: 1   DILTIAZEM HCL ER BEADS 360 MG Oral Capsule SR 24 Hr TAKE 1 CAPSULE EVERY DAY Disp: 90 capsule Rfl: 1   ATORVASTATIN 10 MG Oral Tab TAKE 1 TABLET EVERY NIGHT Disp TEST ONCE DAILY AS DIRECTED Disp: 100 strip Rfl: 3   RANITIDINE  MG Oral Cap TAKE 1 CAPSULE(300 MG) BY MOUTH EVERY DAY Disp: 90 capsule Rfl: 0   BD PEN NEEDLE MINI U/F 31G X 5 MM Does not apply Misc INJECT  DAILY.  Disp: 90 each Rfl: 3   Pilocarpine Irregular bowel habits    • Leaking of urine    • Lichen sclerosus    • Nausea    • Nerve pain    • Osteoarthritis    • Pain in joints    • PONV (postoperative nausea and vomiting)    • Sjogren's syndrome (HCC)    • Sleep disturbance    • Small bowel obstr Other         siblings   • Other (DM) Other         siblings   • Cancer Other    • Diabetes Brother    • Heart Attack Brother       Social History    Socioeconomic History      Marital status: Single      Spouse name: Not on file      Number of children: N bathing. Questions were invited and answered to the best of my ability. The patient indicates understanding of these issues and agrees to the plan. Return for Surgery.     Merrick Ruffin DPM  1/1/2019

## 2019-01-02 ENCOUNTER — TELEPHONE (OUTPATIENT)
Dept: PODIATRY CLINIC | Facility: CLINIC | Age: 69
End: 2019-01-02

## 2019-01-02 ENCOUNTER — TELEPHONE (OUTPATIENT)
Dept: FAMILY MEDICINE CLINIC | Facility: CLINIC | Age: 69
End: 2019-01-02

## 2019-01-02 DIAGNOSIS — G57.82 NEUROMA OF THIRD INTERSPACE OF LEFT FOOT: Primary | ICD-10-CM

## 2019-01-02 NOTE — TELEPHONE ENCOUNTER
Called Hilda back at [address] [place] [address]. The referral was entered by Dr. Nita Hassan for the colonoscopy. I sent a note to P to see if this could get approved asap.    Will also check with Latha Win in Centralized referrals to see if there is anything else we can do for

## 2019-01-02 NOTE — TELEPHONE ENCOUNTER
Dread Guerra from [address] [place] [address] called stated patient is scheduled for colonoscopy procedure tomorrow with Dr. Jaxon Johnson and needs procedure authorization number. Dread Guerra has tried to [employer] and was told to contact pcp for authorization.

## 2019-01-03 PROBLEM — Z86.010 PERSONAL HISTORY OF COLONIC POLYPS: Status: ACTIVE | Noted: 2019-01-03

## 2019-01-03 PROBLEM — K64.1 SECOND DEGREE HEMORRHOIDS: Status: ACTIVE | Noted: 2019-01-03

## 2019-01-03 PROBLEM — K57.30 DIVERTICULOSIS OF LARGE INTESTINE WITHOUT PERFORATION OR ABSCESS WITHOUT BLEEDING: Status: ACTIVE | Noted: 2019-01-03

## 2019-01-03 PROBLEM — D12.3 BENIGN NEOPLASM OF TRANSVERSE COLON: Status: ACTIVE | Noted: 2019-01-03

## 2019-01-03 PROCEDURE — 88305 TISSUE EXAM BY PATHOLOGIST: CPT | Performed by: STUDENT IN AN ORGANIZED HEALTH CARE EDUCATION/TRAINING PROGRAM

## 2019-01-04 DIAGNOSIS — I10 ESSENTIAL HYPERTENSION: ICD-10-CM

## 2019-01-04 NOTE — TELEPHONE ENCOUNTER
Refill request for Lisinopril fails protocol because pt is due for appt this month. LOV 10/18/18 and at that time, pt was advised to follow up in 3 months for diabetes.    Future Appointments   Date Time Provider Elvin Osman   1/17/2019  9:00 AM ED

## 2019-01-07 NOTE — TELEPHONE ENCOUNTER
Called and spoke to pt surgery is scheduled at [facility] on 01/11/19. Pt's PO appointments with  is scheduled on 01/19/19 at 10am.  Pt informed sx center will call the day before surgery with a time to arrive and all other instructions. All questions answered. Thank You. Please put in referral for pt's surgery  EXC OF NEUROMA 3RD INTERSPACE L FOOT  CPT:71320  ICD-10:G57.62

## 2019-01-08 NOTE — TELEPHONE ENCOUNTER
Patient scheduled appointment for 1/29 to see Dr. Tamika Landers for a diabetic follow up. Patient can not come in prior to this date due to her foot surgery. Patient stated she has enough medication to last until appointment.

## 2019-01-09 RX ORDER — LISINOPRIL 30 MG/1
TABLET ORAL
Qty: 30 TABLET | Refills: 0 | OUTPATIENT
Start: 2019-01-09

## 2019-01-10 NOTE — TELEPHONE ENCOUNTER
[facility] calling to state ref is still in open status. Called and spoke to manage care who state they will work on this as pt is scheduled tomorrow. Lesly Hernandez at [facility] and informed her of update.

## 2019-01-11 ENCOUNTER — TELEPHONE (OUTPATIENT)
Dept: UROLOGY | Facility: HOSPITAL | Age: 69
End: 2019-01-11

## 2019-01-11 ENCOUNTER — LAB REQUISITION (OUTPATIENT)
Dept: LAB | Facility: HOSPITAL | Age: 69
End: 2019-01-11
Payer: MEDICARE

## 2019-01-11 DIAGNOSIS — Z01.89 ENCOUNTER FOR OTHER SPECIFIED SPECIAL EXAMINATIONS: ICD-10-CM

## 2019-01-11 DIAGNOSIS — N32.81 OAB (OVERACTIVE BLADDER): Primary | ICD-10-CM

## 2019-01-11 PROCEDURE — 88304 TISSUE EXAM BY PATHOLOGIST: CPT | Performed by: PODIATRIST

## 2019-01-11 NOTE — TELEPHONE ENCOUNTER
Patient called stated pharmacy does not cover vesicare. Patient states Jorge Mclain and Myrbetriq are covered. VORB Dr Conner Beck 4mg q day disp.  #30 with 2 refills ordered from [address]

## 2019-01-17 ENCOUNTER — OFFICE VISIT (OUTPATIENT)
Dept: PODIATRY CLINIC | Facility: CLINIC | Age: 69
End: 2019-01-17
Payer: MEDICARE

## 2019-01-17 DIAGNOSIS — G57.82 NEUROMA OF THIRD INTERSPACE OF LEFT FOOT: Primary | ICD-10-CM

## 2019-01-17 DIAGNOSIS — D36.13 NEUROMA OF FOOT: ICD-10-CM

## 2019-01-17 PROCEDURE — 99024 POSTOP FOLLOW-UP VISIT: CPT | Performed by: PODIATRIST

## 2019-01-23 ENCOUNTER — TELEPHONE (OUTPATIENT)
Dept: FAMILY MEDICINE CLINIC | Facility: CLINIC | Age: 69
End: 2019-01-23

## 2019-01-23 ENCOUNTER — PATIENT OUTREACH (OUTPATIENT)
Dept: CASE MANAGEMENT | Age: 69
End: 2019-01-23

## 2019-01-23 DIAGNOSIS — F03.90 DEMENTIA WITHOUT BEHAVIORAL DISTURBANCE, UNSPECIFIED DEMENTIA TYPE (HCC): ICD-10-CM

## 2019-01-23 DIAGNOSIS — E11.65 TYPE 2 DIABETES MELLITUS WITH HYPERGLYCEMIA, WITH LONG-TERM CURRENT USE OF INSULIN (HCC): ICD-10-CM

## 2019-01-23 DIAGNOSIS — Z79.4 TYPE 2 DIABETES MELLITUS WITH HYPERGLYCEMIA, WITH LONG-TERM CURRENT USE OF INSULIN (HCC): ICD-10-CM

## 2019-01-23 DIAGNOSIS — E78.5 HYPERLIPIDEMIA ASSOCIATED WITH TYPE 2 DIABETES MELLITUS (HCC): ICD-10-CM

## 2019-01-23 DIAGNOSIS — J44.9 ASTHMA WITH COPD (CHRONIC OBSTRUCTIVE PULMONARY DISEASE) (HCC): Chronic | ICD-10-CM

## 2019-01-23 DIAGNOSIS — F33.42 RECURRENT MAJOR DEPRESSIVE DISORDER, IN FULL REMISSION (HCC): ICD-10-CM

## 2019-01-23 DIAGNOSIS — F31.9 BIPOLAR 1 DISORDER (HCC): ICD-10-CM

## 2019-01-23 DIAGNOSIS — Z85.41 HX OF CERVICAL CANCER: ICD-10-CM

## 2019-01-23 DIAGNOSIS — I10 ESSENTIAL HYPERTENSION: ICD-10-CM

## 2019-01-23 DIAGNOSIS — E11.69 HYPERLIPIDEMIA ASSOCIATED WITH TYPE 2 DIABETES MELLITUS (HCC): ICD-10-CM

## 2019-01-23 RX ORDER — HYDROCODONE BITARTRATE AND ACETAMINOPHEN 5; 325 MG/1; MG/1
TABLET ORAL
Refills: 0 | COMMUNITY
Start: 2019-01-11

## 2019-01-23 RX ORDER — CEFADROXIL 500 MG/1
CAPSULE ORAL
Refills: 0 | COMMUNITY
Start: 2019-01-11 | End: 2019-02-07

## 2019-01-23 NOTE — TELEPHONE ENCOUNTER
Ditalizem has are fill at [facility]- advised Pt to contact pharmacy,      Will you authorize refill on Pilocarpine? Last script given 5/11/17.  Pt states does not need to take on daily basis so it lasted

## 2019-01-23 NOTE — PROGRESS NOTES
1/23/2019  Spoke to Ivan Grady for CCM. Updates to patient care team/comments: n/a. Patient reported changes in medications: no changes. Med Adherence  Comment: pt reports taking all medications as prescribed. Health Maintenance:    Your Appointm Building #2, fourth floor, [address]. Make sure that you have your pre-op/follow-up appointment scheduled with your doctor after your test is done,  before you leave the office.    Tuesday February 12, 2019  9:00 AM CST  Follow-Up OV with Leela Lim [facility] for [facility]) [address] [facility]  [address]  [place] [id number]   PLEASE NOTE:  As of March 14th, 2018 we have moved to [address] [place] [address],6Th Floor in [address] [facility] [address].     Please park in th appointment scheduled with your doctor after your test is done,  before you leave the office.    Thursday March 21, 2019  9:00 AM CDT  Uro Urgent PC/PTNS with EDW RN  [address] for Pelvic Medicine [facility]) 1 available on the first floor of the garage. Our office is then located in the Medical Building #2, fourth floor, [address].   Make sure that you have your pre-op/follow-up appointment scheduled with your doctor after your test is done,  before you leave the Looking to move possiby in July. States she is sleeping better at night and depression is much better. Feels calmer! Venita psychiatrists left the practice to a hospital but she did refer pt before leaving.   Pt states she forgot the name but has th

## 2019-01-23 NOTE — TELEPHONE ENCOUNTER
Triage: Good Evening :)     Pt would like to know if Dr. Reilly Mederos can refill her Pilocarpine and Diltiazem states she has no refills on file. Notified pt I would contact Dr. Perla Hinkle office (triage) and the nurse will call her back for further assessment.

## 2019-01-23 NOTE — PROGRESS NOTES
Yael Douglas is a 76year old female. Patient presents with:  Post-Op: Pt is here for a post operative visit. Left foot neuroma. Surgery was 1-11-19. Post op dressing dry and intact. Pain level  3. Pt in post op shoes.    Diabetic Foot Care: AM blood s Rfl:    Dicyclomine HCl 20 MG Oral Tab Take 1 tablet (20 mg total) by mouth 3 (three) times daily as needed.  Disp: 90 tablet Rfl: 1   DILTIAZEM HCL ER BEADS 360 MG Oral Capsule SR 24 Hr TAKE 1 CAPSULE EVERY DAY Disp: 90 capsule Rfl: 1   ATORVASTATIN 10 MG Cap TAKE 1 CAPSULE(300 MG) BY MOUTH EVERY DAY Disp: 90 capsule Rfl: 0   BD PEN NEEDLE MINI U/F 31G X 5 MM Does not apply Misc INJECT  DAILY. Disp: 90 each Rfl: 3   Pilocarpine HCl 7.5 MG Oral Tab Take 1 tablet (7.5 mg total) by mouth 3 (three) times daily. • Nerve pain    • Osteoarthritis    • Pain in joints    • PONV (postoperative nausea and vomiting)    • Sjogren's syndrome (HCC)    • Sleep disturbance    • Small bowel obstruction (HCC)    • Stress    • Type II or unspecified type diabetes mellitus with Other (DM) Other         siblings   • Cancer Other    • Diabetes Brother    • Heart Attack Brother       Social History    Socioeconomic History      Marital status: Single      Spouse name: Not on file      Number of children: Not on file      Years of ed

## 2019-01-24 ENCOUNTER — OFFICE VISIT (OUTPATIENT)
Dept: PODIATRY CLINIC | Facility: CLINIC | Age: 69
End: 2019-01-24
Payer: MEDICARE

## 2019-01-24 DIAGNOSIS — G57.82 NEUROMA OF THIRD INTERSPACE OF LEFT FOOT: Primary | ICD-10-CM

## 2019-01-24 PROCEDURE — 99024 POSTOP FOLLOW-UP VISIT: CPT | Performed by: PODIATRIST

## 2019-01-24 RX ORDER — PILOCARPINE HYDROCHLORIDE 7.5 MG/1
7.5 TABLET, FILM COATED ORAL 3 TIMES DAILY
Qty: 90 TABLET | Refills: 1 | Status: SHIPPED | OUTPATIENT
Start: 2019-01-24 | End: 2019-04-04

## 2019-01-24 NOTE — PROGRESS NOTES
Chetan Chen is a 76year old female. Patient presents with:  Post-Op: Pt is here for a pst operativ evisit. AM blood sugastr 121 and pain level 2  Diabetic Foot Care        HPI:   Patient returns to clinic for evaluation. She is now 2 weeks postop. 1000 MCG Oral Tab CR  Disp:  Rfl:    Cholecalciferol (VITAMIN D3) 5000 units Oral Cap Take 5,000 mg by mouth daily. Disp:  Rfl:    Dicyclomine HCl 20 MG Oral Tab Take 1 tablet (20 mg total) by mouth 3 (three) times daily as needed.  Disp: 90 tablet Rfl: 1 TRUE METRIX BLOOD GLUCOSE TEST In Vitro Strip TEST ONCE DAILY AS DIRECTED Disp: 100 strip Rfl: 3   RANITIDINE  MG Oral Cap TAKE 1 CAPSULE(300 MG) BY MOUTH EVERY DAY Disp: 90 capsule Rfl: 0   BD PEN NEEDLE MINI U/F 31G X 5 MM Does not apply Misc IN Nausea    • Nerve pain    • Osteoarthritis    • Pain in joints    • PONV (postoperative nausea and vomiting)    • Sjogren's syndrome (HCC)    • Sleep disturbance    • Small bowel obstruction (HCC)    • Stress    • Type II or unspecified type diabetes melli • Other (DM) Other         siblings   • Cancer Other    • Diabetes Brother    • Heart Attack Brother       Social History    Socioeconomic History      Marital status: Single      Spouse name: Not on file      Number of children: Not on file      Years o plan.    Return in about 2 weeks (around 2/7/2019).     Renard Lee DPM  1/24/2019

## 2019-01-31 PROCEDURE — 99490 CHRNC CARE MGMT STAFF 1ST 20: CPT

## 2019-02-01 ENCOUNTER — TELEPHONE (OUTPATIENT)
Dept: FAMILY MEDICINE CLINIC | Facility: CLINIC | Age: 69
End: 2019-02-01

## 2019-02-07 ENCOUNTER — NURSE ONLY (OUTPATIENT)
Dept: UROLOGY | Facility: HOSPITAL | Age: 69
End: 2019-02-07
Attending: OBSTETRICS & GYNECOLOGY
Payer: MEDICARE

## 2019-02-07 ENCOUNTER — OFFICE VISIT (OUTPATIENT)
Dept: PODIATRY CLINIC | Facility: CLINIC | Age: 69
End: 2019-02-07
Payer: MEDICARE

## 2019-02-07 ENCOUNTER — OFFICE VISIT (OUTPATIENT)
Dept: FAMILY MEDICINE CLINIC | Facility: CLINIC | Age: 69
End: 2019-02-07
Payer: MEDICARE

## 2019-02-07 VITALS
RESPIRATION RATE: 16 BRPM | WEIGHT: 154 LBS | DIASTOLIC BLOOD PRESSURE: 70 MMHG | TEMPERATURE: 98 F | HEART RATE: 72 BPM | HEIGHT: 63 IN | SYSTOLIC BLOOD PRESSURE: 136 MMHG | BODY MASS INDEX: 27.29 KG/M2

## 2019-02-07 VITALS
DIASTOLIC BLOOD PRESSURE: 60 MMHG | SYSTOLIC BLOOD PRESSURE: 110 MMHG | HEIGHT: 63 IN | BODY MASS INDEX: 28.35 KG/M2 | WEIGHT: 160 LBS

## 2019-02-07 DIAGNOSIS — J44.9 ASTHMA WITH COPD (CHRONIC OBSTRUCTIVE PULMONARY DISEASE) (HCC): Chronic | ICD-10-CM

## 2019-02-07 DIAGNOSIS — D12.3 BENIGN NEOPLASM OF TRANSVERSE COLON: ICD-10-CM

## 2019-02-07 DIAGNOSIS — K57.30 DIVERTICULOSIS OF LARGE INTESTINE WITHOUT PERFORATION OR ABSCESS WITHOUT BLEEDING: ICD-10-CM

## 2019-02-07 DIAGNOSIS — N39.3 FEMALE STRESS INCONTINENCE: ICD-10-CM

## 2019-02-07 DIAGNOSIS — F31.9 BIPOLAR 1 DISORDER (HCC): ICD-10-CM

## 2019-02-07 DIAGNOSIS — Z86.010 PERSONAL HISTORY OF COLONIC POLYPS: ICD-10-CM

## 2019-02-07 DIAGNOSIS — E53.8 B12 DEFICIENCY: ICD-10-CM

## 2019-02-07 DIAGNOSIS — N39.41 URGE INCONTINENCE: ICD-10-CM

## 2019-02-07 DIAGNOSIS — M35.9 XEROSTOMIA DUE TO AUTOIMMUNE DISEASE (HCC): ICD-10-CM

## 2019-02-07 DIAGNOSIS — D36.13 NEUROMA OF FOOT: ICD-10-CM

## 2019-02-07 DIAGNOSIS — L90.0 LICHEN SCLEROSUS: ICD-10-CM

## 2019-02-07 DIAGNOSIS — K21.9 GASTROESOPHAGEAL REFLUX DISEASE WITHOUT ESOPHAGITIS: ICD-10-CM

## 2019-02-07 DIAGNOSIS — E11.69 HYPERLIPIDEMIA ASSOCIATED WITH TYPE 2 DIABETES MELLITUS (HCC): ICD-10-CM

## 2019-02-07 DIAGNOSIS — F33.42 RECURRENT MAJOR DEPRESSIVE DISORDER, IN FULL REMISSION (HCC): ICD-10-CM

## 2019-02-07 DIAGNOSIS — N39.41 URGE INCONTINENCE OF URINE: ICD-10-CM

## 2019-02-07 DIAGNOSIS — Z98.890 POST-OPERATIVE STATE: Primary | ICD-10-CM

## 2019-02-07 DIAGNOSIS — E11.65 TYPE 2 DIABETES MELLITUS WITH HYPERGLYCEMIA, WITH LONG-TERM CURRENT USE OF INSULIN (HCC): ICD-10-CM

## 2019-02-07 DIAGNOSIS — N39.46 MIXED STRESS AND URGE URINARY INCONTINENCE: ICD-10-CM

## 2019-02-07 DIAGNOSIS — Z85.41 HX OF CERVICAL CANCER: ICD-10-CM

## 2019-02-07 DIAGNOSIS — G57.82 NEUROMA OF THIRD INTERSPACE OF LEFT FOOT: ICD-10-CM

## 2019-02-07 DIAGNOSIS — K64.1 SECOND DEGREE HEMORRHOIDS: ICD-10-CM

## 2019-02-07 DIAGNOSIS — I10 ESSENTIAL HYPERTENSION: ICD-10-CM

## 2019-02-07 DIAGNOSIS — N39.3 SUI (STRESS URINARY INCONTINENCE, FEMALE): ICD-10-CM

## 2019-02-07 DIAGNOSIS — G47.00 INSOMNIA, UNSPECIFIED TYPE: ICD-10-CM

## 2019-02-07 DIAGNOSIS — Z00.00 ENCOUNTER FOR ANNUAL HEALTH EXAMINATION: Primary | ICD-10-CM

## 2019-02-07 DIAGNOSIS — M35.00 SJOGREN'S SYNDROME, WITH UNSPECIFIED ORGAN INVOLVEMENT (HCC): ICD-10-CM

## 2019-02-07 DIAGNOSIS — Z79.4 TYPE 2 DIABETES MELLITUS WITH HYPERGLYCEMIA, WITH LONG-TERM CURRENT USE OF INSULIN (HCC): ICD-10-CM

## 2019-02-07 DIAGNOSIS — I35.0 AORTIC STENOSIS, MILD: ICD-10-CM

## 2019-02-07 DIAGNOSIS — I70.0 AORTIC CALCIFICATION (HCC): ICD-10-CM

## 2019-02-07 DIAGNOSIS — F03.90 DEMENTIA WITHOUT BEHAVIORAL DISTURBANCE, UNSPECIFIED DEMENTIA TYPE (HCC): ICD-10-CM

## 2019-02-07 DIAGNOSIS — E78.5 HYPERLIPIDEMIA ASSOCIATED WITH TYPE 2 DIABETES MELLITUS (HCC): ICD-10-CM

## 2019-02-07 DIAGNOSIS — K58.0 IRRITABLE BOWEL SYNDROME WITH DIARRHEA: ICD-10-CM

## 2019-02-07 DIAGNOSIS — K11.7 XEROSTOMIA DUE TO AUTOIMMUNE DISEASE (HCC): ICD-10-CM

## 2019-02-07 DIAGNOSIS — K57.30 DIVERTICULOSIS OF LARGE INTESTINE WITHOUT HEMORRHAGE: ICD-10-CM

## 2019-02-07 DIAGNOSIS — N32.81 OAB (OVERACTIVE BLADDER): Primary | ICD-10-CM

## 2019-02-07 LAB
CARTRIDGE LOT#: ABNORMAL NUMERIC
HEMOGLOBIN A1C: 8.2 % (ref 4.3–5.6)

## 2019-02-07 PROCEDURE — 64566 NEUROELTRD STIM POST TIBIAL: CPT

## 2019-02-07 PROCEDURE — 99024 POSTOP FOLLOW-UP VISIT: CPT | Performed by: PODIATRIST

## 2019-02-07 PROCEDURE — 99397 PER PM REEVAL EST PAT 65+ YR: CPT | Performed by: FAMILY MEDICINE

## 2019-02-07 PROCEDURE — 83036 HEMOGLOBIN GLYCOSYLATED A1C: CPT | Performed by: FAMILY MEDICINE

## 2019-02-07 PROCEDURE — G0438 PPPS, INITIAL VISIT: HCPCS | Performed by: FAMILY MEDICINE

## 2019-02-07 PROCEDURE — 96160 PT-FOCUSED HLTH RISK ASSMT: CPT | Performed by: FAMILY MEDICINE

## 2019-02-07 RX ORDER — LISINOPRIL 30 MG/1
TABLET ORAL
Qty: 90 TABLET | Refills: 3 | Status: SHIPPED | OUTPATIENT
Start: 2019-02-07

## 2019-02-07 NOTE — PATIENT INSTRUCTIONS
Verito Barker's SCREENING SCHEDULE   Tests on this list are recommended by your physician but may not be covered, or covered at this frequency, by your insurer. Please check with your insurance carrier before scheduling to verify coverage.    [facility]A patients who meet one of the following criteria:   • Men who are 73-68 years old and have smoked more than 100 cigarettes in their lifetime   • Anyone with a family history    Colorectal Cancer Screening  Covered up to Age 76     Colonoscopy Screen   Cover regularly   Immunizations      Influenza  Covered Annually No orders found for this or any previous visit.  Please get every year    Pneumococcal 13 (Prevnar)  Covered Once after 65 Orders placed or performed in visit on 12/10/15   • PNEUMOCOCCAL VACC, 13 V available in [address])  www. [email or web address] org  This link also has information from the [address] [place] [address] regarding Advance Directives.

## 2019-02-07 NOTE — PROCEDURES
[facility] for Pelvic Medicine  URGENT PC Therapy Note    Date:  2019    Patient Name:  Julisa Barrientos  Patient :  1950   Patient MRN:  [id number]  Patient Age:  76year old      Diagnosis:  N39.41 Urge Incontinence    Procedure:  R placed over the identified and cleaned insertion site. The needle was positioned so that it created a 60 degree angle that was maintained between the electrode itself and the ankle.   The stop plug in the guide tube was removed to release the needle electr needle electrode clip was removed from the needle electrode. With appropriate gentle motion the needle lecture was removed from the leg and no significant bleeding was noted. Gentle pressure at the needle insertion site facilitate optimal hemostasis.   Charan Sheppard

## 2019-02-07 NOTE — PROGRESS NOTES
HPI:   Hal Low is a 76year old female who presents for a MA (Medicare Advantage) Supervisit (Once per calendar year). DM dx at age 54. Current medication:  Lantus (started 2016) Taking 34 units at night and Glimepiride 2mg BID.   FB- truly abnormal, document plan in chart below     Functional Ability/Status   Senait Wiggins has a completely normal functional assessment! Please see flow sheet section for details.       Depression Screening (PHQ-2/PHQ-9): Over the LAST [address] Hx of cervical cancer     Recurrent major depressive disorder, in full remission ([facility] [address]     Type 2 diabetes mellitus with hyperglycemia, with long-term current use of insulin (HCC)     B12 deficiency     Essential hypertension     Hyperlipidemia associated Hr Take 1 tablet (4 mg total) by mouth daily. OLANZapine 7.5 MG Oral Tab Take 1 tablet (7.5 mg total) by mouth nightly. BuPROPion HCl ER, XL, 300 MG Oral Tablet 24 Hr Take 1 tablet (300 mg total) by mouth once daily.    Donepezil HCl 10 MG Oral Tab TAKE DIRECTED. Albuterol Sulfate  (90 Base) MCG/ACT Inhalation Aero Soln Inhale 2 puffs into the lungs every 6 (six) hours as needed for Wheezing.    PANTOPRAZOLE SODIUM 40 MG Oral Tab EC TAKE 1 TABLET TWICE DAILY (Patient taking differently: TAKE 1 TAB Lichen sclerosus, Nausea, Nerve pain, Osteoarthritis, Pain in joints, PONV (postoperative nausea and vomiting), Sjogren's syndrome ([address] Sleep disturbance, Small bowel obstruction ([address] Stress, Type II or unspecified type diabetes mellitus without mentio anxiety    EXAM:   /70   Pulse 72   Temp 98.1 °F (36.7 °C) (Oral)   Resp 16   Ht 63\"   Wt 154 lb   BMI 27.28 kg/m²  Estimated body mass index is 27.28 kg/m² as calculated from the following:    Height as of this encounter: 63\".     Weight as of this statin  - LIPID PANEL; Future  - COMP METABOLIC PANEL (14); Future    4. Type 2 diabetes mellitus with hyperglycemia, with long-term current use of insulin (HCC)  A1c up. Work on diet. If FBG not improving, will adjust insulin.   A1c in 3 months.   - ROSHAN 5/7/2019).      Queta Perez MD, 2/7/2019     General Health     In the past six months, have you lost more than 10 pounds without trying?: 1 - Yes  Has your appetite been poor?: No  How does the patient maintain a good energy level?: Other  How would yo 33-67, age 72 and older at high risk There are no preventive care reminders to display for this patient. Update Health Maintenance if applicable    Chlamydia  Annually if high risk No results found for: CHLAMYDIA No flowsheet data found.     Screening Mammo results found for: DIGOXIN, DIG, VALP No flowsheet data found. Diabetes      HgbA1C  Annually HEMOGLOBIN A1C (%)   Date Value   10/18/2018 7.3 (A)       No flowsheet data found.     Creat/alb ratio  Annually No results found for: Geetha Dorado

## 2019-02-11 NOTE — PROGRESS NOTES
Hal Low is a 76year old female. Patient presents with:  Post-Op: s/p left foot neuroma -- Sx on 01/11/19. States foot is still swollen and rates pain 3/10. Denies any fever or calf pain.          HPI:   Patient was seen today still complaining of Rfl:    Cholecalciferol (VITAMIN D3) 5000 units Oral Cap Take 5,000 mg by mouth daily. Disp:  Rfl:    Dicyclomine HCl 20 MG Oral Tab Take 1 tablet (20 mg total) by mouth 3 (three) times daily as needed.  Disp: 90 tablet Rfl: 1   DILTIAZEM HCL ER BEADS 360 In Vitro Strip TEST ONCE DAILY AS DIRECTED Disp: 100 strip Rfl: 3   RANITIDINE  MG Oral Cap TAKE 1 CAPSULE(300 MG) BY MOUTH EVERY DAY Disp: 90 capsule Rfl: 0   BD PEN NEEDLE MINI U/F 31G X 5 MM Does not apply Misc INJECT  DAILY.  Disp: 90 each Rfl: 3 Osteoarthritis    • Pain in joints    • PONV (postoperative nausea and vomiting)    • Sjogren's syndrome (HCC)    • Sleep disturbance    • Small bowel obstruction (HCC)    • Stress    • Type II or unspecified type diabetes mellitus without mention of compl siblings   • Cancer Other    • Diabetes Brother    • Heart Attack Brother       Social History    Socioeconomic History      Marital status: Single      Spouse name: Not on file      Number of children: Not on file      Years of education: Not on file

## 2019-02-13 ENCOUNTER — OFFICE VISIT (OUTPATIENT)
Dept: SURGERY | Facility: CLINIC | Age: 69
End: 2019-02-13
Payer: MEDICARE

## 2019-02-13 VITALS
DIASTOLIC BLOOD PRESSURE: 72 MMHG | HEIGHT: 63 IN | WEIGHT: 150 LBS | HEART RATE: 70 BPM | TEMPERATURE: 98 F | SYSTOLIC BLOOD PRESSURE: 153 MMHG | BODY MASS INDEX: 26.58 KG/M2

## 2019-02-13 DIAGNOSIS — K64.8 HEMORRHOIDS, INTERNAL, WITH BLEEDING: Primary | ICD-10-CM

## 2019-02-13 PROCEDURE — 46600 DIAGNOSTIC ANOSCOPY SPX: CPT | Performed by: SURGERY

## 2019-02-13 PROCEDURE — 99213 OFFICE O/P EST LOW 20 MIN: CPT | Performed by: SURGERY

## 2019-02-13 NOTE — PROGRESS NOTES
Follow Up Visit Note       Active Problems      1. Hemorrhoids, internal, with bleeding          Chief Complaint   Patient presents with:  Hemorrhoids: Est patient here for hemorrhoids.  Colonoscopy done 1/3/19 with Dr. Jude Mcqueen.  c/o rectal bleeding with mami Diarrhea    • Diarrhea, unspecified    • Diverticulosis     uncomplicated   • DM (diabetes mellitus) (HCC)     diet controlled   • Elevated LFTs    • Esophageal reflux    • Exposure to radiation 1997   • Fatigue 1992   • Fecal incontinence 3/2012   • [facility] Performed by Nicole Villegas MD at [address] [facility]   • KNEE REPLACEMENT SURGERY     • MIDURETHRAL SLING  08/06/2018   • NEEDLE BIOPSY LIVER     • OTHER      anal sphincteroplasty    • OTHER SURGICAL HISTORY  1996    Exploratory laparotomy   • OTHER SURGICAL HIS TABLET(30 MG) BY MOUTH DAILY, Disp: 90 tablet, Rfl: 3  •  Pilocarpine HCl 7.5 MG Oral Tab, Take 1 tablet (7.5 mg total) by mouth 3 (three) times daily. , Disp: 90 tablet, Rfl: 1  •  HYDROcodone-acetaminophen 5-325 MG Oral Tab, TK 1-2 TS PO Q 6 H PRN P, Disp Take 1 tablet (600 mg total) by mouth every 6 (six) hours. , Disp: 40 tablet, Rfl: 2  •  insulin glargine (LANTUS SOLOSTAR) 100 UNIT/ML Subcutaneous Solution Pen-injector, Inject 34 Units into the skin nightly.  As directed., Disp: 10 pen, Rfl: 11  •  Pimecr triamcinolone acetonide (KENALOG) 0.1 % Apply Externally Cream, Apply  topically 2 (two) times daily. , Disp: , Rfl:      Review of Systems  The Review of Systems has been reviewed by me during today.   Review of Systems   Constitutional: Negative for chills tone normal. Pelvic exam was performed with patient prone.    Genitourinary Comments: No Rectocele  No Rectovaginal Fistula  No Episiotomy  Anal Sphincter Intact, slightly decreased tone  No Pruritis Ani  No Lichenification  No Abscess  No Fistula in ano  N

## 2019-02-14 ENCOUNTER — NURSE ONLY (OUTPATIENT)
Dept: UROLOGY | Facility: HOSPITAL | Age: 69
End: 2019-02-14
Attending: OBSTETRICS & GYNECOLOGY
Payer: MEDICARE

## 2019-02-14 VITALS
BODY MASS INDEX: 26.58 KG/M2 | SYSTOLIC BLOOD PRESSURE: 110 MMHG | WEIGHT: 150 LBS | DIASTOLIC BLOOD PRESSURE: 58 MMHG | HEIGHT: 63 IN

## 2019-02-14 DIAGNOSIS — N32.81 OAB (OVERACTIVE BLADDER): Primary | ICD-10-CM

## 2019-02-14 DIAGNOSIS — N39.3 FEMALE STRESS INCONTINENCE: ICD-10-CM

## 2019-02-14 DIAGNOSIS — N39.41 URGE INCONTINENCE OF URINE: ICD-10-CM

## 2019-02-14 PROCEDURE — 64566 NEUROELTRD STIM POST TIBIAL: CPT

## 2019-02-14 NOTE — PROCEDURES
[facility] for Pelvic Medicine  URGENT PC Therapy Note    Date:  2019    Patient Name:  Radha Myers  Patient :  1950   Patient MRN:  [id number]  Patient Age:  76year old      Diagnosis:  N39.41 Urge Incontinence    Procedure: placed over the identified and cleaned insertion site. The needle was positioned so that it created a 60 degree angle that was maintained between the electrode itself and the ankle.   The stop plug in the guide tube was removed to release the needle electr needle electrode clip was removed from the needle electrode. With appropriate gentle motion the needle lecture was removed from the leg and no significant bleeding was noted. Gentle pressure at the needle insertion site facilitate optimal hemostasis.   Nataly Martin

## 2019-02-20 ENCOUNTER — OFFICE VISIT (OUTPATIENT)
Dept: SURGERY | Facility: CLINIC | Age: 69
End: 2019-02-20
Payer: MEDICARE

## 2019-02-20 VITALS
TEMPERATURE: 97 F | BODY MASS INDEX: 26.58 KG/M2 | WEIGHT: 150 LBS | SYSTOLIC BLOOD PRESSURE: 140 MMHG | HEART RATE: 67 BPM | HEIGHT: 63 IN | DIASTOLIC BLOOD PRESSURE: 83 MMHG

## 2019-02-20 DIAGNOSIS — K64.8 INTERNAL HEMORRHOIDS WITH COMPLICATION: Primary | ICD-10-CM

## 2019-02-20 PROCEDURE — 46221 LIGATION OF HEMORRHOID(S): CPT | Performed by: SURGERY

## 2019-02-20 NOTE — PROCEDURES
Pre op diagnosis: Symptomatic Internal Hemorrhoids    Post op diagnosis:  Symptomatic Internal Hemorrhoids    Procedure: Anoscopy with O-ring Rubber Band Ligation of Internal Hemorrhoids, 4:00    Surgeon:  Dr. Kathy Colon    Operative findings:    The

## 2019-02-20 NOTE — PROGRESS NOTES
Follow Up Visit Note       Active Problems      1.  Internal hemorrhoids with complication          Chief Complaint   Patient presents with:  Hemorrhoids: 1st banding         History of Present Illness  The patient is a 41-year-old female who follows up for 2006   • Leaking of urine    • Lichen sclerosus    • Nausea    • Nerve pain    • Osteoarthritis    • Pain in joints    • PONV (postoperative nausea and vomiting)    • Sjogren's syndrome (HCC)    • Sleep disturbance    • Small bowel obstruction (HCC)    • S Heart Disease Mother    • Stroke Mother    • Heart Disorder Mother 80        12/2014- heart attack   • Colon Polyps Mother    • Hypertension Mother    • Heart Attack Mother    • Other (CAD) Mother    • Bleeding Disorders Mother    • Other (HTN) Other 3  •  diazepam (VALIUM) 5 MG Oral Tab, Take 1 tablet (5 mg total) by mouth daily as needed for Anxiety. , Disp: 90 tablet, Rfl: 3  •  OLANZapine 2.5 MG Oral Tab, Take 1 tablet (2.5 mg total) by mouth daily. , Disp: 90 tablet, Rfl: 3  •  Metoclopramide HCl 5 DIRECTED., Disp: 600 each, Rfl: 1  •  Albuterol Sulfate  (90 Base) MCG/ACT Inhalation Aero Soln, Inhale 2 puffs into the lungs every 6 (six) hours as needed for Wheezing., Disp: , Rfl:   •  PANTOPRAZOLE SODIUM 40 MG Oral Tab EC, TAKE 1 TABLET TWICE myalgias. Skin: Negative for color change and rash. Neurological: Negative for tremors, syncope and weakness. Hematological: Negative for adenopathy. Does not bruise/bleed easily.    Psychiatric/Behavioral: Negative for behavioral problems and sleep d

## 2019-02-21 ENCOUNTER — NURSE ONLY (OUTPATIENT)
Dept: UROLOGY | Facility: HOSPITAL | Age: 69
End: 2019-02-21
Attending: OBSTETRICS & GYNECOLOGY
Payer: MEDICARE

## 2019-02-21 VITALS — BODY MASS INDEX: 26.58 KG/M2 | WEIGHT: 150 LBS | HEIGHT: 63 IN

## 2019-02-21 DIAGNOSIS — I10 ESSENTIAL HYPERTENSION: ICD-10-CM

## 2019-02-21 DIAGNOSIS — N39.46 MIXED STRESS AND URGE URINARY INCONTINENCE: ICD-10-CM

## 2019-02-21 PROCEDURE — 64566 NEUROELTRD STIM POST TIBIAL: CPT

## 2019-02-21 NOTE — PROGRESS NOTES
[facility]  URGENT PC Therapy Note    Date:  2019    Patient Name:  Willy Spencer  Patient :  1950   Patient MRN:  [id number]  Patient Age:  71year old      Diagnosis:  R35.0 Urinary Frequency    Procedure:  R placed over the identified and cleaned insertion site. The needle was positioned so that it created a 60 degree angle that was maintained between the electrode itself and the ankle.   The stop plug in the guide tube was removed to release the needle electr electrode clip was removed from the needle electrode. With appropriate gentle motion the needle lecture was removed from the leg and no significant bleeding was noted. Gentle pressure at the needle insertion site facilitate optimal hemostasis.   The jocelynn

## 2019-02-25 DIAGNOSIS — N32.81 OAB (OVERACTIVE BLADDER): ICD-10-CM

## 2019-02-25 RX ORDER — FESOTERODINE FUMARATE 4 MG/1
TABLET, FILM COATED, EXTENDED RELEASE ORAL
Qty: 30 TABLET | Refills: 0 | Status: SHIPPED | OUTPATIENT
Start: 2019-02-25 | End: 2019-09-17

## 2019-02-25 NOTE — TELEPHONE ENCOUNTER
Spoke to patient, in need for refill has 1/2 week left. Patient currently in process of PTNS just finished #3.    VORB Dr Arsenio Alcazar 4mg PO daily #30 with 3 refills

## 2019-02-26 ENCOUNTER — PATIENT OUTREACH (OUTPATIENT)
Dept: CASE MANAGEMENT | Age: 69
End: 2019-02-26

## 2019-02-26 DIAGNOSIS — J44.9 ASTHMA WITH COPD (CHRONIC OBSTRUCTIVE PULMONARY DISEASE) (HCC): Chronic | ICD-10-CM

## 2019-02-26 DIAGNOSIS — G47.00 INSOMNIA, UNSPECIFIED TYPE: ICD-10-CM

## 2019-02-26 DIAGNOSIS — E11.65 TYPE 2 DIABETES MELLITUS WITH HYPERGLYCEMIA, WITH LONG-TERM CURRENT USE OF INSULIN (HCC): ICD-10-CM

## 2019-02-26 DIAGNOSIS — Z85.41 HX OF CERVICAL CANCER: ICD-10-CM

## 2019-02-26 DIAGNOSIS — I10 ESSENTIAL HYPERTENSION: ICD-10-CM

## 2019-02-26 DIAGNOSIS — F33.42 RECURRENT MAJOR DEPRESSIVE DISORDER, IN FULL REMISSION (HCC): ICD-10-CM

## 2019-02-26 DIAGNOSIS — Z79.4 TYPE 2 DIABETES MELLITUS WITH HYPERGLYCEMIA, WITH LONG-TERM CURRENT USE OF INSULIN (HCC): ICD-10-CM

## 2019-02-26 DIAGNOSIS — E78.5 HYPERLIPIDEMIA ASSOCIATED WITH TYPE 2 DIABETES MELLITUS (HCC): ICD-10-CM

## 2019-02-26 DIAGNOSIS — E11.69 HYPERLIPIDEMIA ASSOCIATED WITH TYPE 2 DIABETES MELLITUS (HCC): ICD-10-CM

## 2019-02-26 DIAGNOSIS — F31.9 BIPOLAR 1 DISORDER (HCC): ICD-10-CM

## 2019-02-26 DIAGNOSIS — F03.90 DEMENTIA WITHOUT BEHAVIORAL DISTURBANCE, UNSPECIFIED DEMENTIA TYPE (HCC): ICD-10-CM

## 2019-02-26 RX ORDER — LISINOPRIL 30 MG/1
TABLET ORAL
Qty: 30 TABLET | Refills: 0 | OUTPATIENT
Start: 2019-02-26

## 2019-02-26 NOTE — PROGRESS NOTES
2/26/2019  Spoke to Kaylen for CCM. Updates to patient care team/comments: n/a. Patient reported changes in medications: no changes. Med Adherence  Comment: pt reports taking all medications as prescribed. Health Maintenance:    Your Appointm Established with Rosangela Holloway, [address], [address] ([address] [facility]) [address] [place], [place] [address] [id number]  [phone]   Thursday March 14, 2019  9:00 AM CDT  [facility]/PTNS with EDW RN  [facility]) [address]  [address] [facility]  [phone]   PLEASE NOTE:  As of March 14th, 2018 we have moved to [address] [place] [address],[address] Floor in [address] [facility] [address].     Please park in the [facility] garage on [facility] (down from the ER) and you state. Still has little swelling and it's hard to bend her toes. Pt reports the surgeon advised to to ice and take Motrin  And pt says it's helping some.   States she's started the acupuncture sessions and will be doing them for twelve weeks she's done thr insulin. States she doesn't crave bad foods like she use to. Last A1c 8.2 with previous being 7.3. States she will work on her diet compliance more. Checks sugars every morning states they fluctuate but she stabilizes them.   States she thinks stressors

## 2019-02-27 NOTE — TELEPHONE ENCOUNTER
Name from pharmacy: LISINOPRIL 30MG TABLETS          Will file in chart as: LISINOPRIL 30 MG Oral Tab    The source prescription was reordered on 2/7/2019 by Jesse Pinzon MD.    Sig: TAKE 1 TABLET(30 MG) BY MOUTH DAILY    Disp:  30 tablet    Refills:

## 2019-02-28 ENCOUNTER — NURSE ONLY (OUTPATIENT)
Dept: UROLOGY | Facility: HOSPITAL | Age: 69
End: 2019-02-28
Attending: OBSTETRICS & GYNECOLOGY
Payer: MEDICARE

## 2019-02-28 VITALS — SYSTOLIC BLOOD PRESSURE: 126 MMHG | DIASTOLIC BLOOD PRESSURE: 74 MMHG

## 2019-02-28 DIAGNOSIS — N32.81 OAB (OVERACTIVE BLADDER): Primary | ICD-10-CM

## 2019-02-28 PROCEDURE — 64566 NEUROELTRD STIM POST TIBIAL: CPT

## 2019-02-28 PROCEDURE — 99490 CHRNC CARE MGMT STAFF 1ST 20: CPT

## 2019-02-28 NOTE — PROCEDURES
[facility]  URGENT PC Therapy Note    Date:  2019    Patient Name:  Yael Douglas  Patient :  1950   Patient MRN:  [id number]  Patient Age:  71year old      Diagnosis:  N39.41 Urge Incontinence    Procedure: placed over the identified and cleaned insertion site. The needle was positioned so that it created a 60 degree angle that was maintained between the electrode itself and the ankle.   The stop plug in the guide tube was removed to release the needle electr stimulator was turned off and the needle electrode clip was removed from the needle electrode. With appropriate gentle motion the needle lecture was removed from the leg and no significant bleeding was noted.   Gentle pressure at the needle insertion site

## 2019-03-04 DIAGNOSIS — E11.65 TYPE 2 DIABETES MELLITUS WITH HYPERGLYCEMIA, WITH LONG-TERM CURRENT USE OF INSULIN (HCC): ICD-10-CM

## 2019-03-04 DIAGNOSIS — E78.5 HYPERLIPIDEMIA ASSOCIATED WITH TYPE 2 DIABETES MELLITUS (HCC): Primary | ICD-10-CM

## 2019-03-04 DIAGNOSIS — E11.69 HYPERLIPIDEMIA ASSOCIATED WITH TYPE 2 DIABETES MELLITUS (HCC): Primary | ICD-10-CM

## 2019-03-04 DIAGNOSIS — Z79.4 TYPE 2 DIABETES MELLITUS WITH HYPERGLYCEMIA, WITH LONG-TERM CURRENT USE OF INSULIN (HCC): ICD-10-CM

## 2019-03-06 ENCOUNTER — OFFICE VISIT (OUTPATIENT)
Dept: SURGERY | Facility: CLINIC | Age: 69
End: 2019-03-06
Payer: MEDICARE

## 2019-03-06 VITALS
HEART RATE: 81 BPM | SYSTOLIC BLOOD PRESSURE: 122 MMHG | HEIGHT: 63 IN | WEIGHT: 150 LBS | DIASTOLIC BLOOD PRESSURE: 67 MMHG | BODY MASS INDEX: 26.58 KG/M2

## 2019-03-06 DIAGNOSIS — K64.8 INTERNAL HEMORRHOIDS WITH COMPLICATION: Primary | ICD-10-CM

## 2019-03-06 PROCEDURE — 46221 LIGATION OF HEMORRHOID(S): CPT | Performed by: SURGERY

## 2019-03-06 NOTE — PROGRESS NOTES
Follow Up Visit Note       Active Problems      1. Internal hemorrhoids with complication          Chief Complaint   Patient presents with:  Anal / Rectal Problem: 2nd Banding. PT states everything went well with the 1st banding.         History of Present syndrome)    • Indigestion    • Irregular bowel habits    • Itch of skin 2006   • Leaking of urine    • Lichen sclerosus    • Nausea    • Nerve pain    • Osteoarthritis    • Pain in joints    • PONV (postoperative nausea and vomiting)    • Sjogren's syndro Mental Disorder Father    • Other (CAD,HTN) Father    • Heart Disease Mother    • Stroke Mother    • Heart Disorder Mother 80        12/2014- heart attack   • Colon Polyps Mother    • Hypertension Mother    • Heart Attack Mother    • Other (CAD) Mother Citalopram Hydrobromide 40 MG Oral Tab, Take 1 tablet (40 mg total) by mouth once daily. , Disp: 90 tablet, Rfl: 3  •  diazepam (VALIUM) 5 MG Oral Tab, Take 1 tablet (5 mg total) by mouth daily as needed for Anxiety.  (Patient taking differently: Take 5 mg b Solution, , Disp: , Rfl:   •  Magnesium Oxide 400 (240 Mg) MG Oral Tab, 2 (two) times daily.   , Disp: , Rfl:   •  BD SWAB SINGLE USE REGULAR Does not apply Pads, USE 3 TO 6 TIMES DAILY AS DIRECTED., Disp: 600 each, Rfl: 1  •  Albuterol Sulfate  (90 Musculoskeletal: Negative for arthralgias and myalgias. Skin: Negative for color change and rash. Neurological: Negative for tremors, syncope and weakness. Hematological: Negative for adenopathy. Does not bruise/bleed easily.    Psychiatric/Behavior

## 2019-03-07 ENCOUNTER — OFFICE VISIT (OUTPATIENT)
Dept: PODIATRY CLINIC | Facility: CLINIC | Age: 69
End: 2019-03-07
Payer: MEDICARE

## 2019-03-07 ENCOUNTER — NURSE ONLY (OUTPATIENT)
Dept: UROLOGY | Facility: HOSPITAL | Age: 69
End: 2019-03-07
Attending: OBSTETRICS & GYNECOLOGY
Payer: MEDICARE

## 2019-03-07 VITALS — DIASTOLIC BLOOD PRESSURE: 74 MMHG | SYSTOLIC BLOOD PRESSURE: 118 MMHG

## 2019-03-07 DIAGNOSIS — Z98.890 POST-OPERATIVE STATE: ICD-10-CM

## 2019-03-07 DIAGNOSIS — N32.81 OAB (OVERACTIVE BLADDER): Primary | ICD-10-CM

## 2019-03-07 DIAGNOSIS — G57.82 NEUROMA OF THIRD INTERSPACE OF LEFT FOOT: Primary | ICD-10-CM

## 2019-03-07 PROCEDURE — 99024 POSTOP FOLLOW-UP VISIT: CPT | Performed by: PODIATRIST

## 2019-03-07 PROCEDURE — 64566 NEUROELTRD STIM POST TIBIAL: CPT

## 2019-03-07 NOTE — PROCEDURES
Pre op diagnosis: Symptomatic Internal Hemorrhoids    Post op diagnosis:  Symptomatic Internal Hemorrhoids    Procedure: Anoscopy with O-ring Rubber Band Ligation of Internal Hemorrhoids, 2:00    Surgeon:  Dr. Neris Day    Operative findings:    The

## 2019-03-07 NOTE — PROCEDURES
[facility]  URGENT PC Therapy Note    Date:  3/7/2019    Patient Name:  Chetan Chen  Patient :  1950   Patient MRN:  [id number]  Patient Age:  71year old      Diagnosis:  R39.15 Urgency of Urination    Procedure: placed over the identified and cleaned insertion site. The needle was positioned so that it created a 60 degree angle that was maintained between the electrode itself and the ankle.   The stop plug in the guide tube was removed to release the needle electr stimulator was turned off and the needle electrode clip was removed from the needle electrode. With appropriate gentle motion the needle lecture was removed from the leg and no significant bleeding was noted.   Gentle pressure at the needle insertion site

## 2019-03-08 RX ORDER — CALCIUM CITRATE/VITAMIN D3 200MG-6.25
TABLET ORAL
Qty: 100 STRIP | Refills: 0 | Status: SHIPPED | OUTPATIENT
Start: 2019-03-08 | End: 2019-08-14

## 2019-03-08 RX ORDER — ATORVASTATIN CALCIUM 10 MG/1
TABLET, FILM COATED ORAL
Qty: 90 TABLET | Refills: 0 | Status: SHIPPED | OUTPATIENT
Start: 2019-03-08 | End: 2019-07-26

## 2019-03-08 NOTE — TELEPHONE ENCOUNTER
Medication refilled per protocol.      Requested Prescriptions     Signed Prescriptions Disp Refills   • ATORVASTATIN 10 MG Oral Tab 90 tablet 0     Sig: TAKE 1 TABLET EVERY NIGHT     Authorizing Provider: Raul Perez     Ordering User: Ivan Ray

## 2019-03-11 NOTE — PROGRESS NOTES
Hal Low is a 71year old female. Patient presents with:  Post-Op: Pt is here for post operative visit for left foot neuroma. Healin well. Small amount of pain on walkiong to the bottom of the foot at times.  0 pain now        HPI:   Patient is now Oral Tab Take 0.5 tablets (2.5 mg total) by mouth 3 (three) times daily before meals. Disp: 90 tablet Rfl: 5   Cyanocobalamin (VITAMIN B12) 1000 MCG Oral Tab CR  Disp:  Rfl:    Cholecalciferol (VITAMIN D3) 5000 units Oral Cap Take 5,000 mg by mouth daily. capsule Rfl: 0   BD PEN NEEDLE MINI U/F 31G X 5 MM Does not apply Misc INJECT  DAILY. Disp: 90 each Rfl: 3   Vitamin C 500 MG Oral Tab Take 500 mg by mouth daily.  Disp:  Rfl:    Ondansetron HCl (ZOFRAN) 4 mg tablet Take 1 tablet (4 mg total) by mouth daily of depression    • History of mental disorder 1992   • HTN (hypertension)    • Hyperlipidemia    • IBS (irritable bowel syndrome)    • Indigestion    • Irregular bowel habits    • Itch of skin 2006   • Leaking of urine    • Lichen sclerosus    • Nausea Colon Polyps Father    • Diabetes Father    • Mental Disorder Father    • Other (CAD,HTN) Father    • Heart Disease Mother    • Stroke Mother    • Heart Disorder Mother 80        12/2014- heart attack   • Colon Polyps Mother    • Hypertension Mother    • H space has numbness I did tell her that that would probably be permanent. Still has a little swelling on physical exam which should dissipate with time   4.  Musculoskeletal: Good muscle strength there is no deficits toes are in good alignment on the left f

## 2019-03-14 ENCOUNTER — NURSE ONLY (OUTPATIENT)
Dept: UROLOGY | Facility: HOSPITAL | Age: 69
End: 2019-03-14
Attending: OBSTETRICS & GYNECOLOGY
Payer: MEDICARE

## 2019-03-14 VITALS
DIASTOLIC BLOOD PRESSURE: 70 MMHG | WEIGHT: 150 LBS | BODY MASS INDEX: 26.58 KG/M2 | SYSTOLIC BLOOD PRESSURE: 124 MMHG | HEIGHT: 63 IN

## 2019-03-14 DIAGNOSIS — N39.3 FEMALE STRESS INCONTINENCE: ICD-10-CM

## 2019-03-14 DIAGNOSIS — N39.41 URGE INCONTINENCE OF URINE: ICD-10-CM

## 2019-03-14 DIAGNOSIS — N32.81 OAB (OVERACTIVE BLADDER): Primary | ICD-10-CM

## 2019-03-14 DIAGNOSIS — N39.46 MIXED STRESS AND URGE URINARY INCONTINENCE: ICD-10-CM

## 2019-03-14 PROCEDURE — 64566 NEUROELTRD STIM POST TIBIAL: CPT

## 2019-03-14 NOTE — PROCEDURES
[facility]  URGENT PC Therapy Note    Date:  3/14/2019    Patient Name:  Theresa Beyer  Patient :  1950   Patient MRN:  [id number]  Patient Age:  71year old      Diagnosis:  N39.41 Urge Incontinence and R35.0 Urina electrode/guide tube assembly was placed over the identified and cleaned insertion site. The needle was positioned so that it created a 60 degree angle that was maintained between the electrode itself and the ankle.   The stop plug in the guide tube was re stimulator was turned off and the needle electrode clip was removed from the needle electrode. With appropriate gentle motion the needle lecture was removed from the leg and no significant bleeding was noted.   Gentle pressure at the needle insertion site

## 2019-03-20 ENCOUNTER — OFFICE VISIT (OUTPATIENT)
Dept: SURGERY | Facility: CLINIC | Age: 69
End: 2019-03-20

## 2019-03-20 VITALS
WEIGHT: 150 LBS | DIASTOLIC BLOOD PRESSURE: 72 MMHG | HEART RATE: 76 BPM | BODY MASS INDEX: 26.58 KG/M2 | HEIGHT: 63 IN | SYSTOLIC BLOOD PRESSURE: 145 MMHG

## 2019-03-20 DIAGNOSIS — Z53.21 PROCEDURE AND TREATMENT NOT CARRIED OUT DUE TO PATIENT LEAVING PRIOR TO BEING SEEN BY HEALTH CARE PROVIDER: Primary | ICD-10-CM

## 2019-03-21 ENCOUNTER — NURSE ONLY (OUTPATIENT)
Dept: UROLOGY | Facility: HOSPITAL | Age: 69
End: 2019-03-21
Attending: OBSTETRICS & GYNECOLOGY
Payer: MEDICARE

## 2019-03-21 VITALS — WEIGHT: 150 LBS | BODY MASS INDEX: 26.58 KG/M2 | HEIGHT: 63 IN

## 2019-03-21 DIAGNOSIS — N39.46 MIXED STRESS AND URGE URINARY INCONTINENCE: ICD-10-CM

## 2019-03-21 DIAGNOSIS — N32.81 OAB (OVERACTIVE BLADDER): Primary | ICD-10-CM

## 2019-03-21 PROCEDURE — 64566 NEUROELTRD STIM POST TIBIAL: CPT

## 2019-03-21 NOTE — PROCEDURES
[facility]  URGENT PC Therapy Note    Date:  3/21/2019    Patient Name:  Phillip Rinne  Patient :  1950   Patient MRN:  [id number]  Patient Age:  71year old      Diagnosis:  N39.41 Urge Incontinence and R35.0 Urina electrode/guide tube assembly was placed over the identified and cleaned insertion site. The needle was positioned so that it created a 60 degree angle that was maintained between the electrode itself and the ankle.   The stop plug in the guide tube was re stimulator was turned off and the needle electrode clip was removed from the needle electrode. With appropriate gentle motion the needle lecture was removed from the leg and no significant bleeding was noted.   Gentle pressure at the needle insertion site

## 2019-03-21 NOTE — PROGRESS NOTES
The physician was delayed in seeing the patient, and the patient had to leave before being seen. Her appointment has been rescheduled for March 22.     Bertha Harris MD

## 2019-03-22 ENCOUNTER — OFFICE VISIT (OUTPATIENT)
Dept: SURGERY | Facility: CLINIC | Age: 69
End: 2019-03-22
Payer: MEDICARE

## 2019-03-22 VITALS
TEMPERATURE: 98 F | HEIGHT: 63 IN | WEIGHT: 150 LBS | DIASTOLIC BLOOD PRESSURE: 75 MMHG | SYSTOLIC BLOOD PRESSURE: 152 MMHG | HEART RATE: 62 BPM | BODY MASS INDEX: 26.58 KG/M2

## 2019-03-22 DIAGNOSIS — K64.8 INTERNAL HEMORRHOIDS WITH COMPLICATION: Primary | ICD-10-CM

## 2019-03-22 PROCEDURE — 46221 LIGATION OF HEMORRHOID(S): CPT | Performed by: SURGERY

## 2019-03-22 NOTE — PROGRESS NOTES
Follow Up Visit Note       Active Problems      1.  Internal hemorrhoids with complication          Chief Complaint   Patient presents with:  Hemorrhoid Banding: 3rd hemorrhoid banding        History of Present Illness  The patient presents for her third ru habits    • Itch of skin 2006   • Leaking of urine    • Lichen sclerosus    • Nausea    • Nerve pain    • Osteoarthritis    • Pain in joints    • PONV (postoperative nausea and vomiting)    • Sjogren's syndrome (HCC)    • Sleep disturbance    • Small bowel Father    • Heart Disease Mother    • Stroke Mother    • Heart Disorder Mother 80        12/2014- heart attack   • Colon Polyps Mother    • Hypertension Mother    • Heart Attack Mother    • Other (CAD) Mother    • Bleeding Disorders Mother    • Other (HTN) total) by mouth once daily. , Disp: 90 tablet, Rfl: 3  •  Donepezil HCl 10 MG Oral Tab, TAKE 1 TABLET EVERY EVENING, Disp: 90 tablet, Rfl: 3  •  Citalopram Hydrobromide 40 MG Oral Tab, Take 1 tablet (40 mg total) by mouth once daily. , Disp: 90 tablet, Rfl: 60 g, Rfl: 2  •  LUBRICANT EYE DROPS 0.4-0.3 % Ophthalmic Solution, , Disp: , Rfl:   •  Magnesium Oxide 400 (240 Mg) MG Oral Tab, 2 (two) times daily. , Disp: , Rfl:   •  BD SWAB SINGLE USE REGULAR Does not apply Pads, USE 3 TO 6 TIMES DAILY AS DIRECTED. , myalgias. Skin: Negative for color change and rash. Neurological: Negative for tremors, syncope and weakness. Hematological: Negative for adenopathy. Does not bruise/bleed easily.    Psychiatric/Behavioral: Negative for behavioral problems and sleep d

## 2019-03-22 NOTE — PROCEDURES
Pre op diagnosis: Symptomatic Internal Hemorrhoids    Post op diagnosis:  Symptomatic Internal Hemorrhoids    Procedure: Anoscopy with O-ring Rubber Band Ligation of Internal Hemorrhoids, 9:00    Surgeon:  Dr. Rogelio Lopez    Operative findings:    The

## 2019-03-26 ENCOUNTER — PATIENT OUTREACH (OUTPATIENT)
Dept: CASE MANAGEMENT | Age: 69
End: 2019-03-26

## 2019-03-26 DIAGNOSIS — J44.9 ASTHMA WITH COPD (CHRONIC OBSTRUCTIVE PULMONARY DISEASE) (HCC): Chronic | ICD-10-CM

## 2019-03-26 DIAGNOSIS — I10 ESSENTIAL HYPERTENSION: ICD-10-CM

## 2019-03-26 DIAGNOSIS — E78.5 HYPERLIPIDEMIA ASSOCIATED WITH TYPE 2 DIABETES MELLITUS (HCC): ICD-10-CM

## 2019-03-26 DIAGNOSIS — F31.9 BIPOLAR 1 DISORDER (HCC): ICD-10-CM

## 2019-03-26 DIAGNOSIS — E11.65 TYPE 2 DIABETES MELLITUS WITH HYPERGLYCEMIA, WITH LONG-TERM CURRENT USE OF INSULIN (HCC): ICD-10-CM

## 2019-03-26 DIAGNOSIS — F33.42 RECURRENT MAJOR DEPRESSIVE DISORDER, IN FULL REMISSION (HCC): ICD-10-CM

## 2019-03-26 DIAGNOSIS — Z79.4 TYPE 2 DIABETES MELLITUS WITH HYPERGLYCEMIA, WITH LONG-TERM CURRENT USE OF INSULIN (HCC): ICD-10-CM

## 2019-03-26 DIAGNOSIS — F03.90 DEMENTIA WITHOUT BEHAVIORAL DISTURBANCE, UNSPECIFIED DEMENTIA TYPE (HCC): ICD-10-CM

## 2019-03-26 DIAGNOSIS — E11.69 HYPERLIPIDEMIA ASSOCIATED WITH TYPE 2 DIABETES MELLITUS (HCC): ICD-10-CM

## 2019-03-26 DIAGNOSIS — G47.00 INSOMNIA, UNSPECIFIED TYPE: ICD-10-CM

## 2019-03-26 DIAGNOSIS — Z85.41 HX OF CERVICAL CANCER: ICD-10-CM

## 2019-03-26 NOTE — PROGRESS NOTES
3/26/2019  Spoke to Kaylen for CCM. Updates to patient care team/comments: yes,   Suzi Garcias MD   [phone] SURGERY, GENERAL   Patient reported changes in medications: yes, pt states antibiotic therapy finished. Medication list updated. doctor after your test is done,  before you leave the office.    Tuesday May 07, 2019 12:00 PM CDT  Exam - Established Patient with Gabrielle Avina MD  [facility], [address], [place]  ([address]) [facility], [facility] instructions. Pt instructed to break into style issues gradually.   Recommended she continue in well-padded athletic shoe for period of about a month and then wear other shoes as tolerated after that and activity levels as tolerated with a gradual break-in

## 2019-03-28 ENCOUNTER — NURSE ONLY (OUTPATIENT)
Dept: UROLOGY | Facility: HOSPITAL | Age: 69
End: 2019-03-28
Attending: OBSTETRICS & GYNECOLOGY
Payer: MEDICARE

## 2019-03-28 VITALS
BODY MASS INDEX: 26.58 KG/M2 | SYSTOLIC BLOOD PRESSURE: 120 MMHG | WEIGHT: 150 LBS | HEIGHT: 63 IN | DIASTOLIC BLOOD PRESSURE: 70 MMHG

## 2019-03-28 DIAGNOSIS — N32.81 OAB (OVERACTIVE BLADDER): Primary | ICD-10-CM

## 2019-03-28 DIAGNOSIS — N39.46 MIXED STRESS AND URGE URINARY INCONTINENCE: ICD-10-CM

## 2019-03-28 PROCEDURE — 64566 NEUROELTRD STIM POST TIBIAL: CPT

## 2019-03-28 NOTE — PROCEDURES
[facility]  URGENT PC Therapy Note    Date:  3/28/2019    Patient Name:  Kaylin Watters  Patient :  1950   Patient MRN:  [id number]  Patient Age:  71year old      Diagnosis:  N39.41 Urge Incontinence and R35.0 Urina electrode/guide tube assembly was placed over the identified and cleaned insertion site. The needle was positioned so that it created a 60 degree angle that was maintained between the electrode itself and the ankle.   The stop plug in the guide tube was re stimulator was turned off and the needle electrode clip was removed from the needle electrode. With appropriate gentle motion the needle lecture was removed from the leg and no significant bleeding was noted.   Gentle pressure at the needle insertion site

## 2019-03-31 PROCEDURE — 99490 CHRNC CARE MGMT STAFF 1ST 20: CPT

## 2019-04-03 ENCOUNTER — OFFICE VISIT (OUTPATIENT)
Dept: SURGERY | Facility: CLINIC | Age: 69
End: 2019-04-03
Payer: MEDICARE

## 2019-04-03 VITALS
SYSTOLIC BLOOD PRESSURE: 126 MMHG | DIASTOLIC BLOOD PRESSURE: 79 MMHG | WEIGHT: 150 LBS | HEART RATE: 62 BPM | TEMPERATURE: 97 F | BODY MASS INDEX: 27 KG/M2

## 2019-04-03 DIAGNOSIS — K64.8 INTERNAL HEMORRHOIDS WITH COMPLICATION: Primary | ICD-10-CM

## 2019-04-03 PROCEDURE — 46600 DIAGNOSTIC ANOSCOPY SPX: CPT | Performed by: SURGERY

## 2019-04-03 NOTE — PROGRESS NOTES
Follow Up Visit Note       Active Problems      1.  Internal hemorrhoids with complication          Chief Complaint   Patient presents with:  Hemorrhoid Banding: pt is here for 4th banding, pt denies bleeding or concerns        History of Present Illness  T (irritable bowel syndrome)    • Indigestion    • Irregular bowel habits    • Itch of skin 2006   • Leaking of urine    • Lichen sclerosus    • Nausea    • Nerve pain    • Osteoarthritis    • Pain in joints    • PONV (postoperative nausea and vomiting)    • Diabetes Father    • Mental Disorder Father    • Other (CAD,HTN) Father    • Heart Disease Mother    • Stroke Mother    • Heart Disorder Mother 80        12/2014- heart attack   • Colon Polyps Mother    • Hypertension Mother    • Heart Attack Mother    • O Disp: 90 tablet, Rfl: 3  •  Donepezil HCl 10 MG Oral Tab, TAKE 1 TABLET EVERY EVENING, Disp: 90 tablet, Rfl: 3  •  Citalopram Hydrobromide 40 MG Oral Tab, Take 1 tablet (40 mg total) by mouth once daily. , Disp: 90 tablet, Rfl: 3  •  diazepam (VALIUM) 5 MG DROPS 0.4-0.3 % Ophthalmic Solution, , Disp: , Rfl:   •  Magnesium Oxide 400 (240 Mg) MG Oral Tab, 2 (two) times daily.   , Disp: , Rfl:   •  BD SWAB SINGLE USE REGULAR Does not apply Pads, USE 3 TO 6 TIMES DAILY AS DIRECTED., Disp: 600 each, Rfl: 1  •  Alb urgency. Musculoskeletal: Negative for arthralgias and myalgias. Skin: Negative for color change and rash. Neurological: Negative for tremors, syncope and weakness. Hematological: Negative for adenopathy. Does not bruise/bleed easily.    Psychiatric

## 2019-04-04 ENCOUNTER — NURSE ONLY (OUTPATIENT)
Dept: UROLOGY | Facility: HOSPITAL | Age: 69
End: 2019-04-04
Attending: OBSTETRICS & GYNECOLOGY
Payer: MEDICARE

## 2019-04-04 VITALS
BODY MASS INDEX: 26.58 KG/M2 | HEIGHT: 63 IN | DIASTOLIC BLOOD PRESSURE: 68 MMHG | SYSTOLIC BLOOD PRESSURE: 130 MMHG | WEIGHT: 150 LBS

## 2019-04-04 DIAGNOSIS — N32.81 OAB (OVERACTIVE BLADDER): Primary | ICD-10-CM

## 2019-04-04 DIAGNOSIS — I10 ESSENTIAL HYPERTENSION: ICD-10-CM

## 2019-04-04 DIAGNOSIS — N39.46 MIXED STRESS AND URGE URINARY INCONTINENCE: ICD-10-CM

## 2019-04-04 DIAGNOSIS — N39.41 URGE INCONTINENCE OF URINE: ICD-10-CM

## 2019-04-04 PROCEDURE — 64566 NEUROELTRD STIM POST TIBIAL: CPT

## 2019-04-04 NOTE — PROCEDURES
[facility] for [facility]  URGENT PC Therapy Note    Date:  2019    Patient Name:  Machelle Figueroa  Patient :  1950   Patient MRN:  [id number]  Patient Age:  71year old      Diagnosis:  N39.41 Urge Incontinence    Procedure:  R placed over the identified and cleaned insertion site. The needle was positioned so that it created a 60 degree angle that was maintained between the electrode itself and the ankle.   The stop plug in the guide tube was removed to release the needle electr electrode clip was removed from the needle electrode. With appropriate gentle motion the needle lecture was removed from the leg and no significant bleeding was noted. Gentle pressure at the needle insertion site facilitate optimal hemostasis.   The jocelynn

## 2019-04-05 RX ORDER — GLIMEPIRIDE 2 MG/1
TABLET ORAL
Qty: 180 TABLET | Refills: 1 | Status: SHIPPED | OUTPATIENT
Start: 2019-04-05

## 2019-04-05 RX ORDER — PILOCARPINE HYDROCHLORIDE 7.5 MG/1
TABLET, FILM COATED ORAL
Qty: 180 TABLET | Refills: 1 | Status: SHIPPED | OUTPATIENT
Start: 2019-04-05

## 2019-04-05 RX ORDER — DILTIAZEM HYDROCHLORIDE 360 MG/1
CAPSULE, EXTENDED RELEASE ORAL
Qty: 90 CAPSULE | Refills: 1 | Status: SHIPPED | OUTPATIENT
Start: 2019-04-05

## 2019-04-05 RX ORDER — MIRTAZAPINE 30 MG/1
TABLET, FILM COATED ORAL
Qty: 90 TABLET | Refills: 1 | OUTPATIENT
Start: 2019-04-05

## 2019-04-08 ENCOUNTER — TELEPHONE (OUTPATIENT)
Dept: FAMILY MEDICINE CLINIC | Facility: CLINIC | Age: 69
End: 2019-04-08

## 2019-04-08 ENCOUNTER — PATIENT OUTREACH (OUTPATIENT)
Dept: CASE MANAGEMENT | Age: 69
End: 2019-04-08

## 2019-04-08 DIAGNOSIS — F31.9 BIPOLAR 1 DISORDER (HCC): ICD-10-CM

## 2019-04-08 DIAGNOSIS — F33.42 RECURRENT MAJOR DEPRESSIVE DISORDER, IN FULL REMISSION (HCC): ICD-10-CM

## 2019-04-08 DIAGNOSIS — E11.65 TYPE 2 DIABETES MELLITUS WITH HYPERGLYCEMIA, WITH LONG-TERM CURRENT USE OF INSULIN (HCC): ICD-10-CM

## 2019-04-08 DIAGNOSIS — E11.69 HYPERLIPIDEMIA ASSOCIATED WITH TYPE 2 DIABETES MELLITUS (HCC): ICD-10-CM

## 2019-04-08 DIAGNOSIS — I10 ESSENTIAL HYPERTENSION: ICD-10-CM

## 2019-04-08 DIAGNOSIS — J44.9 ASTHMA WITH COPD (CHRONIC OBSTRUCTIVE PULMONARY DISEASE) (HCC): Chronic | ICD-10-CM

## 2019-04-08 DIAGNOSIS — I35.0 AORTIC STENOSIS, MILD: ICD-10-CM

## 2019-04-08 DIAGNOSIS — Z85.41 HX OF CERVICAL CANCER: ICD-10-CM

## 2019-04-08 DIAGNOSIS — Z79.4 TYPE 2 DIABETES MELLITUS WITH HYPERGLYCEMIA, WITH LONG-TERM CURRENT USE OF INSULIN (HCC): ICD-10-CM

## 2019-04-08 DIAGNOSIS — E78.5 HYPERLIPIDEMIA ASSOCIATED WITH TYPE 2 DIABETES MELLITUS (HCC): ICD-10-CM

## 2019-04-08 DIAGNOSIS — F03.90 DEMENTIA WITHOUT BEHAVIORAL DISTURBANCE, UNSPECIFIED DEMENTIA TYPE (HCC): ICD-10-CM

## 2019-04-08 DIAGNOSIS — M35.00 SJOGREN'S SYNDROME, WITH UNSPECIFIED ORGAN INVOLVEMENT (HCC): ICD-10-CM

## 2019-04-08 NOTE — TELEPHONE ENCOUNTER
Triage: Happy Monday :)     Pt requesting a copy of her medical records states she is moving to [place] in June. Chetan Rick would like to know what steps she needs to take for this?   Notified pt I would contact Dr. Chika Thornton office (triage) and a nurse will call

## 2019-04-08 NOTE — PROGRESS NOTES
4/8/2019  Spoke to Kaylen for CCM. Updates to patient care team/comments: n/a. Patient reported changes in medications: no chnages. Med Adherence  Comment: pt reports taking all medications as prescribed. Health Maintenance:    Your Appointme A1c 8.2 with previous being 7. 3.  States she continues to work on her diet compliance.  Checks sugars every morning states they fluctuate but she stabilizes them.  States she thinks stressors elevate her sugar and feels once she moves to [facility]'s frieda month.   Reason For Follow Up: review progress and or barriers towards patients goals. Care Managers Interventions: Continue to provide encouragement, support, and education for healthy coping and dx.   Time Spent This Encounter Total: 22 min medical re

## 2019-04-09 DIAGNOSIS — Z79.4 TYPE 2 DIABETES MELLITUS WITH HYPERGLYCEMIA, WITH LONG-TERM CURRENT USE OF INSULIN (HCC): ICD-10-CM

## 2019-04-09 DIAGNOSIS — E11.65 TYPE 2 DIABETES MELLITUS WITH HYPERGLYCEMIA, WITH LONG-TERM CURRENT USE OF INSULIN (HCC): ICD-10-CM

## 2019-04-09 NOTE — TELEPHONE ENCOUNTER
Patient would like a refill on her insulin pen, send to [facility] mail order.

## 2019-04-11 ENCOUNTER — NURSE ONLY (OUTPATIENT)
Dept: UROLOGY | Facility: HOSPITAL | Age: 69
End: 2019-04-11
Attending: OBSTETRICS & GYNECOLOGY
Payer: MEDICARE

## 2019-04-11 VITALS — WEIGHT: 150 LBS | BODY MASS INDEX: 26.58 KG/M2 | HEIGHT: 63 IN

## 2019-04-11 DIAGNOSIS — N39.46 MIXED STRESS AND URGE URINARY INCONTINENCE: ICD-10-CM

## 2019-04-11 DIAGNOSIS — N32.81 OAB (OVERACTIVE BLADDER): Primary | ICD-10-CM

## 2019-04-11 PROCEDURE — 64566 NEUROELTRD STIM POST TIBIAL: CPT

## 2019-04-11 NOTE — PROCEDURES
[facility]  URGENT PC Therapy Note    Date:  2019    Patient Name:  Senait Wiggins  Patient :  1950   Patient MRN:  [id number]  Patient Age:  71year old      Diagnosis:  N39.41 Urge Incontinence    Procedure: placed over the identified and cleaned insertion site. The needle was positioned so that it created a 60 degree angle that was maintained between the electrode itself and the ankle.   The stop plug in the guide tube was removed to release the needle electr needle electrode clip was removed from the needle electrode. With appropriate gentle motion the needle lecture was removed from the leg and no significant bleeding was noted. Gentle pressure at the needle insertion site facilitate optimal hemostasis.   Kisha Boyd

## 2019-04-16 NOTE — TELEPHONE ENCOUNTER
Requested Prescriptions     Signed Prescriptions Disp Refills   • Insulin Pen Needle (BD PEN NEEDLE MINI U/F) 31G X 5 MM Does not apply Misc 90 each 3     Sig: INJECT  DAILY.      Authorizing Provider: Evelyn Shane     Ordering User: Agus Olguin

## 2019-04-16 NOTE — TELEPHONE ENCOUNTER
Spoke with patient to let her know that she needs to come into the office to complete a Medical Records release. She verbalized understanding and will be in sometime this week.

## 2019-04-18 ENCOUNTER — NURSE ONLY (OUTPATIENT)
Dept: UROLOGY | Facility: HOSPITAL | Age: 69
End: 2019-04-18
Attending: OBSTETRICS & GYNECOLOGY
Payer: MEDICARE

## 2019-04-18 VITALS
BODY MASS INDEX: 26.58 KG/M2 | SYSTOLIC BLOOD PRESSURE: 124 MMHG | DIASTOLIC BLOOD PRESSURE: 76 MMHG | HEIGHT: 63 IN | WEIGHT: 150 LBS

## 2019-04-18 DIAGNOSIS — N39.41 URGE INCONTINENCE: ICD-10-CM

## 2019-04-18 DIAGNOSIS — N32.81 OAB (OVERACTIVE BLADDER): Primary | ICD-10-CM

## 2019-04-18 PROCEDURE — 64566 NEUROELTRD STIM POST TIBIAL: CPT

## 2019-04-18 NOTE — PROCEDURES
[facility]  URGENT PC Therapy Note    Date:  2019    Patient Name:  Kaylin Watters  Patient :  1950   Patient MRN:  [id number]  Patient Age:  71year old      Diagnosis:  N39.41 Urge Incontinence    Procedure: placed over the identified and cleaned insertion site. The needle was positioned so that it created a 60 degree angle that was maintained between the electrode itself and the ankle.   The stop plug in the guide tube was removed to release the needle electr needle electrode clip was removed from the needle electrode. With appropriate gentle motion the needle lecture was removed from the leg and no significant bleeding was noted. Gentle pressure at the needle insertion site facilitate optimal hemostasis.   Anselmo Wagner

## 2019-04-26 ENCOUNTER — NURSE ONLY (OUTPATIENT)
Dept: UROLOGY | Facility: HOSPITAL | Age: 69
End: 2019-04-26
Attending: OBSTETRICS & GYNECOLOGY
Payer: MEDICARE

## 2019-04-26 VITALS — WEIGHT: 150 LBS | BODY MASS INDEX: 26.58 KG/M2 | RESPIRATION RATE: 16 BRPM | HEIGHT: 63 IN

## 2019-04-26 DIAGNOSIS — N32.81 OAB (OVERACTIVE BLADDER): Primary | ICD-10-CM

## 2019-04-26 DIAGNOSIS — N39.41 URGE INCONTINENCE OF URINE: ICD-10-CM

## 2019-04-26 DIAGNOSIS — N39.46 MIXED STRESS AND URGE URINARY INCONTINENCE: ICD-10-CM

## 2019-04-26 DIAGNOSIS — N39.41 URGE INCONTINENCE: ICD-10-CM

## 2019-04-26 PROCEDURE — 64566 NEUROELTRD STIM POST TIBIAL: CPT

## 2019-04-26 NOTE — PROCEDURES
[facility]  URGENT PC Therapy Note    Date:  2019    Patient Name:  German Leak  Patient :  1950   Patient MRN:  [id number]  Patient Age:  71year old      Diagnosis:  N39.41 Urge Incontinence    Procedure: placed over the identified and cleaned insertion site. The needle was positioned so that it created a 60 degree angle that was maintained between the electrode itself and the ankle.   The stop plug in the guide tube was removed to release the needle electr needle electrode clip was removed from the needle electrode. With appropriate gentle motion the needle lecture was removed from the leg and no significant bleeding was noted. Gentle pressure at the needle insertion site facilitate optimal hemostasis.   Leonel Mclain

## 2019-04-30 PROCEDURE — 99490 CHRNC CARE MGMT STAFF 1ST 20: CPT

## 2019-05-06 ENCOUNTER — OFFICE VISIT (OUTPATIENT)
Dept: UROLOGY | Facility: HOSPITAL | Age: 69
End: 2019-05-06
Attending: OBSTETRICS & GYNECOLOGY
Payer: MEDICARE

## 2019-05-06 VITALS
HEIGHT: 63 IN | BODY MASS INDEX: 26.58 KG/M2 | WEIGHT: 150 LBS | SYSTOLIC BLOOD PRESSURE: 126 MMHG | DIASTOLIC BLOOD PRESSURE: 66 MMHG

## 2019-05-06 DIAGNOSIS — N32.81 OAB (OVERACTIVE BLADDER): Primary | ICD-10-CM

## 2019-05-06 PROCEDURE — 99211 OFF/OP EST MAY X REQ PHY/QHP: CPT

## 2019-05-08 ENCOUNTER — PATIENT OUTREACH (OUTPATIENT)
Dept: CASE MANAGEMENT | Age: 69
End: 2019-05-08

## 2019-05-08 DIAGNOSIS — J44.9 ASTHMA WITH COPD (CHRONIC OBSTRUCTIVE PULMONARY DISEASE) (HCC): Chronic | ICD-10-CM

## 2019-05-08 DIAGNOSIS — E11.69 HYPERLIPIDEMIA ASSOCIATED WITH TYPE 2 DIABETES MELLITUS (HCC): ICD-10-CM

## 2019-05-08 DIAGNOSIS — Z79.4 TYPE 2 DIABETES MELLITUS WITH HYPERGLYCEMIA, WITH LONG-TERM CURRENT USE OF INSULIN (HCC): ICD-10-CM

## 2019-05-08 DIAGNOSIS — I10 ESSENTIAL HYPERTENSION: ICD-10-CM

## 2019-05-08 DIAGNOSIS — M35.00 SJOGREN'S SYNDROME, WITH UNSPECIFIED ORGAN INVOLVEMENT (HCC): ICD-10-CM

## 2019-05-08 DIAGNOSIS — E78.5 HYPERLIPIDEMIA ASSOCIATED WITH TYPE 2 DIABETES MELLITUS (HCC): ICD-10-CM

## 2019-05-08 DIAGNOSIS — F33.42 RECURRENT MAJOR DEPRESSIVE DISORDER, IN FULL REMISSION (HCC): ICD-10-CM

## 2019-05-08 DIAGNOSIS — F31.9 BIPOLAR 1 DISORDER (HCC): ICD-10-CM

## 2019-05-08 DIAGNOSIS — E11.65 TYPE 2 DIABETES MELLITUS WITH HYPERGLYCEMIA, WITH LONG-TERM CURRENT USE OF INSULIN (HCC): ICD-10-CM

## 2019-05-08 DIAGNOSIS — F03.90 DEMENTIA WITHOUT BEHAVIORAL DISTURBANCE, UNSPECIFIED DEMENTIA TYPE (HCC): ICD-10-CM

## 2019-05-08 NOTE — PROGRESS NOTES
5/8/2019  Spoke to Kaylen for CCM. Updates to patient care team/comments: n/a. Patient reported changes in medications: no changes. Med Adherence  Comment: pt reports taking all medications as prescribed.        Health Maintenance: reminded pt of A1C 7.9 (A) 07/24/2018     (H) 03/28/2018     · Active goal from previous outreach: pt would like to restart exercise routine once she is cleared. Work on diet compliance to lower her A1C.       Continue independent living, healthy diet and exerc

## 2019-05-16 ENCOUNTER — TELEPHONE (OUTPATIENT)
Dept: FAMILY MEDICINE CLINIC | Facility: CLINIC | Age: 69
End: 2019-05-16

## 2019-05-16 NOTE — TELEPHONE ENCOUNTER
Received medical records request from patient requesting all records as [place]. All records located in [address] [place] [address] in which request was sent to [employer].  Contact Reji Hart [phone]

## 2019-05-24 RX ORDER — ISOPROPYL ALCOHOL 0.75 G/1
SWAB TOPICAL
Qty: 90 EACH | Refills: 1 | Status: SHIPPED | OUTPATIENT
Start: 2019-05-24

## 2019-05-24 RX ORDER — MIRTAZAPINE 30 MG/1
TABLET, FILM COATED ORAL
Qty: 90 TABLET | Refills: 1 | Status: SHIPPED | OUTPATIENT
Start: 2019-05-24

## 2019-05-31 ENCOUNTER — PATIENT MESSAGE (OUTPATIENT)
Dept: FAMILY MEDICINE CLINIC | Facility: CLINIC | Age: 69
End: 2019-05-31

## 2019-05-31 PROCEDURE — 99490 CHRNC CARE MGMT STAFF 1ST 20: CPT

## 2019-06-03 ENCOUNTER — PATIENT MESSAGE (OUTPATIENT)
Dept: FAMILY MEDICINE CLINIC | Facility: CLINIC | Age: 69
End: 2019-06-03

## 2019-06-03 DIAGNOSIS — M79.672 PAIN IN LEFT FOOT: Primary | ICD-10-CM

## 2019-06-03 NOTE — TELEPHONE ENCOUNTER
From: Julio Forman  To: Princess Kaylin MD  Sent: 6/3/2019 5:02 PM CDT  Subject: Referral Request    It was in February and it was a neuroma of the left foot.  Thank you

## 2019-06-03 NOTE — TELEPHONE ENCOUNTER
From: Machelle Figueroa  To: Delamr Arreola MD  Sent: 5/31/2019 6:43 PM CDT  Subject: Referral Request    Please send a referral for me to see Dr Zamzam Cornell. Having problem with my foot. This would be post up visit.  Thank you

## 2019-06-20 ENCOUNTER — HOSPITAL ENCOUNTER (OUTPATIENT)
Dept: GENERAL RADIOLOGY | Age: 69
Discharge: HOME | End: 2019-06-20
Attending: PODIATRIST
Payer: MEDICARE

## 2019-06-20 ENCOUNTER — OFFICE VISIT (OUTPATIENT)
Dept: PODIATRY CLINIC | Facility: CLINIC | Age: 69
End: 2019-06-20
Payer: MEDICARE

## 2019-06-20 VITALS — SYSTOLIC BLOOD PRESSURE: 153 MMHG | RESPIRATION RATE: 18 BRPM | HEART RATE: 64 BPM | DIASTOLIC BLOOD PRESSURE: 69 MMHG

## 2019-06-20 DIAGNOSIS — M79.672 FOOT PAIN, LEFT: ICD-10-CM

## 2019-06-20 DIAGNOSIS — M77.50 CAPSULITIS OF METATARSOPHALANGEAL (MTP) JOINT: ICD-10-CM

## 2019-06-20 DIAGNOSIS — M77.50 CAPSULITIS OF METATARSOPHALANGEAL (MTP) JOINT: Primary | ICD-10-CM

## 2019-06-20 PROCEDURE — 20600 DRAIN/INJ JOINT/BURSA W/O US: CPT | Performed by: PODIATRIST

## 2019-06-20 PROCEDURE — 99213 OFFICE O/P EST LOW 20 MIN: CPT | Performed by: PODIATRIST

## 2019-06-20 PROCEDURE — 73630 X-RAY EXAM OF FOOT: CPT | Performed by: PODIATRIST

## 2019-06-20 RX ORDER — TRIAMCINOLONE ACETONIDE 40 MG/ML
20 INJECTION, SUSPENSION INTRA-ARTICULAR; INTRAMUSCULAR ONCE
Status: COMPLETED | OUTPATIENT
Start: 2019-06-20 | End: 2019-06-20

## 2019-06-20 RX ADMIN — TRIAMCINOLONE ACETONIDE 20 MG: 40 INJECTION, SUSPENSION INTRA-ARTICULAR; INTRAMUSCULAR at 16:05:00

## 2019-06-20 NOTE — PROGRESS NOTES
Per [facility] request was draw 1 syringe with 0.5 ml of Kenalog 40 mg and 0.5 ml Marcaine 0.5% for this patient left foot. Diane Gao

## 2019-06-23 NOTE — PROGRESS NOTES
Theresa Beyer is a 71year old female. Patient presents with:   Toe Pain: s/p L foot neuroma  from 1/11/19 - has pain in the 4th toe since the sx - has swelling in it also - rates pain as 7/10 with pressure or walking         HPI:   Turns to the clinic h tablet Rfl: 0   lisinopril 30 MG Oral Tab TAKE 1 TABLET(30 MG) BY MOUTH DAILY Disp: 90 tablet Rfl: 3   HYDROcodone-acetaminophen 5-325 MG Oral Tab TK 1-2 TS PO Q 6 H PRN P Disp:  Rfl: 0   OLANZapine 7.5 MG Oral Tab Take 1 tablet (7.5 mg total) by mouth nig Disp:  Rfl:    Magnesium Oxide 400 (240 Mg) MG Oral Tab 2 (two) times daily. Disp:  Rfl:    Albuterol Sulfate  (90 Base) MCG/ACT Inhalation Aero Soln Inhale 2 puffs into the lungs every 6 (six) hours as needed for Wheezing.  Disp:  Rfl:    Glucosam pressure    • High cholesterol    • History of cardiac murmur    • History of depression    • History of mental disorder 1992   • HTN (hypertension)    • Hyperlipidemia    • IBS (irritable bowel syndrome)    • Indigestion    • Irregular bowel habits    • I Gastritis, GERD      Family History   Problem Relation Age of Onset   • Cancer Father    • Colon Polyps Father    • Diabetes Father    • Mental Disorder Father    • Other (CAD,HTN) Father    • Heart Disease Mother    • Stroke Mother    • Heart Disorder Mot patient has palpable dorsalis pedis and posterior tibial pulses on the left foot   3. Neurologic: Patient has intact sensorium with no deficits noted.    4. Musculoskeletal: The patient has pain on palpation of the third interspace there is swelling the fou

## 2019-06-27 ENCOUNTER — PATIENT OUTREACH (OUTPATIENT)
Dept: CASE MANAGEMENT | Age: 69
End: 2019-06-27

## 2019-06-27 NOTE — PROGRESS NOTES
6/27/2019  Spoke to Kaylen for CCM. Updates to patient care team/comments: n/a. Patient reported changes in medications: no changes. Med Adherence  Comment: pt reports taking all medications as prescribed. Health Maintenance:    Your Appointm (H) 12/22/2015     Lab Results   Component Value Date    LDL 51 03/28/2018    LDL 62 05/16/2017    LDL 76 12/22/2015     Lab Results   Component Value Date    AST 35 03/28/2018    AST 15 02/27/2017    AST 20 01/31/2017     Lab Results   Component Value Jesus

## 2019-07-09 ENCOUNTER — OFFICE VISIT (OUTPATIENT)
Dept: PODIATRY CLINIC | Facility: CLINIC | Age: 69
End: 2019-07-09
Payer: MEDICARE

## 2019-07-09 VITALS — HEART RATE: 68 BPM | RESPIRATION RATE: 20 BRPM | SYSTOLIC BLOOD PRESSURE: 164 MMHG | DIASTOLIC BLOOD PRESSURE: 70 MMHG

## 2019-07-09 DIAGNOSIS — M19.079 ARTHRITIS OF JOINT OF TOE: Primary | ICD-10-CM

## 2019-07-09 PROCEDURE — 99212 OFFICE O/P EST SF 10 MIN: CPT | Performed by: PODIATRIST

## 2019-07-09 NOTE — PROGRESS NOTES
Demond Slaughter is a 71year old female. Patient presents with:   Toe Pain: Left 4th toe f/u - s/p L foot neuroma from 1/11/19 - states the pain s the same and is rated as 7/10 at all the time         HPI:   Patient returns to the clinic still having some Take 1 tablet (7.5 mg total) by mouth nightly. Disp: 90 tablet Rfl: 3   BuPROPion HCl ER, XL, 300 MG Oral Tablet 24 Hr Take 1 tablet (300 mg total) by mouth once daily.  Disp: 90 tablet Rfl: 3   Donepezil HCl 10 MG Oral Tab TAKE 1 TABLET EVERY EVENING Disp: needed for Wheezing. Disp:  Rfl:    Glucosamine-Chondroitin (GLUCOSAMINE CHONDR COMPLEX OR) Take by mouth. Disp:  Rfl:    Dextrose, Diabetic Use, (GLUCOSE OR) Take by mouth.  Disp:  Rfl:    RANITIDINE  MG Oral Cap TAKE 1 CAPSULE(300 MG) BY MOUTH EVER • Indigestion    • Irregular bowel habits    • Itch of skin 2006   • Leaking of urine    • Lichen sclerosus    • Nausea    • Nerve pain    • Osteoarthritis    • Pain in joints    • PONV (postoperative nausea and vomiting)    • Sjogren's syndrome (HCC) Mother    • Stroke Mother    • Heart Disorder Mother 80        12/2014- heart attack   • Colon Polyps Mother    • Hypertension Mother    • Heart Attack Mother    • Other (CAD) Mother    • Bleeding Disorders Mother    • Other (HTN) Other         siblings fourth toe. 2. Vascular: Patient has palpable pulses edema markedly reduced   3. Neurologic: Patient has intact sensorium   4. Musculoskeletal: Patient has swelling around the DIP joint of the fourth toe left foot.   The patient has pain on palpation of t

## 2019-07-15 ENCOUNTER — PATIENT OUTREACH (OUTPATIENT)
Dept: CASE MANAGEMENT | Age: 69
End: 2019-07-15

## 2019-07-26 DIAGNOSIS — E11.69 HYPERLIPIDEMIA ASSOCIATED WITH TYPE 2 DIABETES MELLITUS (HCC): ICD-10-CM

## 2019-07-26 DIAGNOSIS — E78.5 HYPERLIPIDEMIA ASSOCIATED WITH TYPE 2 DIABETES MELLITUS (HCC): ICD-10-CM

## 2019-07-29 ENCOUNTER — PATIENT OUTREACH (OUTPATIENT)
Dept: CASE MANAGEMENT | Age: 69
End: 2019-07-29

## 2019-08-02 DIAGNOSIS — E11.65 TYPE 2 DIABETES MELLITUS WITH HYPERGLYCEMIA, WITH LONG-TERM CURRENT USE OF INSULIN (HCC): ICD-10-CM

## 2019-08-02 DIAGNOSIS — Z79.4 TYPE 2 DIABETES MELLITUS WITH HYPERGLYCEMIA, WITH LONG-TERM CURRENT USE OF INSULIN (HCC): ICD-10-CM

## 2019-08-05 DIAGNOSIS — E11.65 TYPE 2 DIABETES MELLITUS WITH HYPERGLYCEMIA, WITH LONG-TERM CURRENT USE OF INSULIN (HCC): ICD-10-CM

## 2019-08-05 DIAGNOSIS — Z79.4 TYPE 2 DIABETES MELLITUS WITH HYPERGLYCEMIA, WITH LONG-TERM CURRENT USE OF INSULIN (HCC): ICD-10-CM

## 2019-08-05 NOTE — TELEPHONE ENCOUNTER
Patient not requesting lancets, she is requesting Lantus solostar and pen needles to her mail order.     Pended script for review - Routed to Dr. Erwin Sotelo

## 2019-08-06 ENCOUNTER — PATIENT OUTREACH (OUTPATIENT)
Dept: CASE MANAGEMENT | Age: 69
End: 2019-08-06

## 2019-08-06 DIAGNOSIS — Z79.4 TYPE 2 DIABETES MELLITUS WITH HYPERGLYCEMIA, WITH LONG-TERM CURRENT USE OF INSULIN (HCC): ICD-10-CM

## 2019-08-06 DIAGNOSIS — E11.65 TYPE 2 DIABETES MELLITUS WITH HYPERGLYCEMIA, WITH LONG-TERM CURRENT USE OF INSULIN (HCC): ICD-10-CM

## 2019-08-06 DIAGNOSIS — E11.69 HYPERLIPIDEMIA ASSOCIATED WITH TYPE 2 DIABETES MELLITUS (HCC): ICD-10-CM

## 2019-08-06 DIAGNOSIS — Z85.41 HX OF CERVICAL CANCER: ICD-10-CM

## 2019-08-06 DIAGNOSIS — E78.5 HYPERLIPIDEMIA ASSOCIATED WITH TYPE 2 DIABETES MELLITUS (HCC): ICD-10-CM

## 2019-08-06 DIAGNOSIS — M35.00 SJOGREN'S SYNDROME, WITH UNSPECIFIED ORGAN INVOLVEMENT (HCC): ICD-10-CM

## 2019-08-06 DIAGNOSIS — F03.90 DEMENTIA WITHOUT BEHAVIORAL DISTURBANCE, UNSPECIFIED DEMENTIA TYPE (HCC): ICD-10-CM

## 2019-08-06 DIAGNOSIS — F31.9 BIPOLAR 1 DISORDER (HCC): ICD-10-CM

## 2019-08-06 DIAGNOSIS — F33.42 RECURRENT MAJOR DEPRESSIVE DISORDER, IN FULL REMISSION (HCC): ICD-10-CM

## 2019-08-06 DIAGNOSIS — I10 ESSENTIAL HYPERTENSION: ICD-10-CM

## 2019-08-06 DIAGNOSIS — J44.9 ASTHMA WITH COPD (CHRONIC OBSTRUCTIVE PULMONARY DISEASE) (HCC): Chronic | ICD-10-CM

## 2019-08-08 NOTE — TELEPHONE ENCOUNTER
LOV with Dr Ulises Campos on 2/7/19, 6 months ago  No future appts scheduled at this time.    Pt was asked to F/U in 3 months (5/2019)

## 2019-08-08 NOTE — PROGRESS NOTES
8/7/2019  Spoke to Kaylen for CCM. Updates to patient care team/comments: n/a. Patient reported changes in medications: no changes. Med Adherence  Comment: pt reports taking all medications as prescribed.        Health Maintenance: notified pt her stick margarines. Cholesterol. Cholesterol sources include high-fat dairy products and high-fat animal proteins, egg yolks, liver, and other organ meats. Aim for no more than 200 milligrams (mg) of cholesterol a day. Sodium.  Aim for less than 2,300 mg of

## 2019-08-08 NOTE — TELEPHONE ENCOUNTER
ATORVASTATIN CALCIUM 10 MG Tablet          Will file in chart as: ATORVASTATIN 10 MG Oral Tab    Sig: TAKE 1 TABLET EVERY NIGHT    Disp:  90 tablet    Refills:  0

## 2019-08-08 NOTE — TELEPHONE ENCOUNTER
Spoke to pt and she states she has moved to [place] therefore will no longer be our pt but also states that Dr Nash Qureshi informed her that she would refill her meds until pt gets re-established (informed of labs and was not able to go before she moved)

## 2019-08-09 ENCOUNTER — TELEPHONE (OUTPATIENT)
Dept: FAMILY MEDICINE CLINIC | Facility: CLINIC | Age: 69
End: 2019-08-09

## 2019-08-09 RX ORDER — ATORVASTATIN CALCIUM 10 MG/1
TABLET, FILM COATED ORAL
Qty: 90 TABLET | Refills: 0 | Status: SHIPPED | OUTPATIENT
Start: 2019-08-09

## 2019-08-09 NOTE — TELEPHONE ENCOUNTER
Triage: Happy Friday :)     Pt requesting refill for her Diazepam states she would like it sent to her mail order on file.   Barrie Mason has recently relocated to [place] (living with her sister) but states Dr. Mckayla Mcfadden is still her PCP until September 1st.  Notifie

## 2019-08-09 NOTE — TELEPHONE ENCOUNTER
PT has moved to [facility], has not updated labs, has not found new pcp. Can we refill temporarily atorvastatin 10? Please advise.  Routed to Dr Sevilla Cleverly in box

## 2019-08-11 NOTE — TELEPHONE ENCOUNTER
[facility] Should get this through them.

## 2019-08-12 NOTE — TELEPHONE ENCOUNTER
Reviewed RADHIKA Bashir's directive with pt. Pt states that her psychiatrist is no longer with the practice. Pt does not currently have a psychiatrist. She is in the process of establishing with doctors in [place].  Her new insurance becomes effective Sept 1 st.

## 2019-08-13 DIAGNOSIS — E78.00 HYPERCHOLESTEROLEMIA: ICD-10-CM

## 2019-08-13 RX ORDER — INSULIN GLARGINE 100 [IU]/ML
INJECTION, SOLUTION SUBCUTANEOUS
Qty: 30 ML | Refills: 0 | OUTPATIENT
Start: 2019-08-13

## 2019-08-14 ENCOUNTER — TELEPHONE (OUTPATIENT)
Dept: FAMILY MEDICINE CLINIC | Facility: CLINIC | Age: 69
End: 2019-08-14

## 2019-08-14 DIAGNOSIS — Z79.4 TYPE 2 DIABETES MELLITUS WITH HYPERGLYCEMIA, WITH LONG-TERM CURRENT USE OF INSULIN (HCC): ICD-10-CM

## 2019-08-14 DIAGNOSIS — E11.65 TYPE 2 DIABETES MELLITUS WITH HYPERGLYCEMIA, WITH LONG-TERM CURRENT USE OF INSULIN (HCC): ICD-10-CM

## 2019-08-14 RX ORDER — BLOOD-GLUCOSE METER
1 EACH MISCELLANEOUS DAILY
Qty: 1 DEVICE | Refills: 0 | Status: SHIPPED | OUTPATIENT
Start: 2019-08-14 | End: 2020-08-13

## 2019-08-14 RX ORDER — CALCIUM CITRATE/VITAMIN D3 200MG-6.25
TABLET ORAL
Qty: 100 STRIP | Refills: 0 | Status: SHIPPED | OUTPATIENT
Start: 2019-08-14

## 2019-08-14 RX ORDER — GLUCOSAM/CHON-MSM1/C/MANG/BOSW 500-416.6
1 TABLET ORAL DAILY
Qty: 100 EACH | Refills: 0 | Status: SHIPPED | OUTPATIENT
Start: 2019-08-14 | End: 2020-08-13

## 2019-08-14 NOTE — TELEPHONE ENCOUNTER
Fax received from [facility] [employer] for a new True Metrix meter, strips and lancets. LOV 2/7/19.  Pt moved to [place] but does not start with new doctor and insurance till 9/1/19  Scripts sent

## 2019-08-16 RX ORDER — DIAZEPAM 5 MG/1
5 TABLET ORAL DAILY PRN
Qty: 30 TABLET | Refills: 0 | Status: SHIPPED | OUTPATIENT
Start: 2019-08-16

## 2019-08-16 NOTE — TELEPHONE ENCOUNTER
Script called to [employer] order - #30 no refills.     Routed to [employer], CHAD for sign-off of script

## 2019-08-16 NOTE — TELEPHONE ENCOUNTER
You may call in an RX for #30 only. No additional refills as these must come through her new provider.

## 2019-08-20 RX ORDER — RANITIDINE 300 MG/1
300 CAPSULE ORAL DAILY
Qty: 90 CAPSULE | Refills: 0 | Status: SHIPPED | OUTPATIENT
Start: 2019-08-20

## 2019-08-20 NOTE — TELEPHONE ENCOUNTER
Ranitidine HCI 300MG    Requested Prescriptions     Pending Prescriptions Disp Refills   • raNITIdine HCl 300 MG Oral Cap 90 capsule 0     LOV 2/7/2019     Patient was asked to follow-up in:   Return in 3 months (5/2019)    Appointment scheduled: Visit brock

## 2019-08-31 PROCEDURE — 99490 CHRNC CARE MGMT STAFF 1ST 20: CPT

## 2019-09-06 ENCOUNTER — PATIENT OUTREACH (OUTPATIENT)
Dept: CASE MANAGEMENT | Age: 69
End: 2019-09-06

## 2019-09-06 NOTE — PROGRESS NOTES
Pt is now with new PCP and insurance lives in [place]. Un enrolled from CCM program.  Pt voiced appreciation for all our extra assistance.

## 2019-09-17 DIAGNOSIS — E11.69 HYPERLIPIDEMIA ASSOCIATED WITH TYPE 2 DIABETES MELLITUS (HCC): ICD-10-CM

## 2019-09-17 DIAGNOSIS — N32.81 OAB (OVERACTIVE BLADDER): ICD-10-CM

## 2019-09-17 DIAGNOSIS — E78.5 HYPERLIPIDEMIA ASSOCIATED WITH TYPE 2 DIABETES MELLITUS (HCC): ICD-10-CM

## 2019-09-18 RX ORDER — ATORVASTATIN CALCIUM 10 MG/1
TABLET, FILM COATED ORAL
Qty: 90 TABLET | Refills: 0 | OUTPATIENT
Start: 2019-09-18

## 2019-09-18 RX ORDER — DIAZEPAM 5 MG/1
TABLET ORAL
Qty: 30 TABLET | Refills: 0 | OUTPATIENT
Start: 2019-09-18

## 2019-09-18 RX ORDER — PILOCARPINE HYDROCHLORIDE 7.5 MG/1
TABLET, FILM COATED ORAL
Qty: 270 TABLET | Refills: 1 | OUTPATIENT
Start: 2019-09-18

## 2019-09-18 NOTE — TELEPHONE ENCOUNTER
Pt has moved to [place] and was to get established with a new PCP.   Request for Diazepam denied- as last refill 8/9/19 was advised that would be last refill

## 2019-10-16 ENCOUNTER — TELEPHONE (OUTPATIENT)
Dept: FAMILY MEDICINE CLINIC | Facility: CLINIC | Age: 69
End: 2019-10-16

## 2019-10-16 NOTE — TELEPHONE ENCOUNTER
Received medical records request from [place] [id number] requesting all patient's medical records as she has moved out of state.  Records printed and mailed to Mana  at [address] [facility], [address] [place] [address].

## 2019-10-19 DIAGNOSIS — E78.5 HYPERLIPIDEMIA ASSOCIATED WITH TYPE 2 DIABETES MELLITUS (HCC): ICD-10-CM

## 2019-10-19 DIAGNOSIS — E11.69 HYPERLIPIDEMIA ASSOCIATED WITH TYPE 2 DIABETES MELLITUS (HCC): ICD-10-CM

## 2019-10-22 ENCOUNTER — TELEPHONE (OUTPATIENT)
Dept: FAMILY MEDICINE CLINIC | Facility: CLINIC | Age: 69
End: 2019-10-22

## 2019-10-22 NOTE — TELEPHONE ENCOUNTER
Mail order pharmacy calling for a refill on her  medications for her diabetic supplies and they need a 90 day supply they sent a fax and are waiting to hear from us.   They could not send a electronic response due to the fact that this is a new Rx for them

## 2019-10-23 RX ORDER — ATORVASTATIN CALCIUM 10 MG/1
TABLET, FILM COATED ORAL
Qty: 90 TABLET | Refills: 0 | OUTPATIENT
Start: 2019-10-23

## 2019-10-23 RX ORDER — PEN NEEDLE, DIABETIC 31 GX3/16"
NEEDLE, DISPOSABLE MISCELLANEOUS
Qty: 100 EACH | Refills: 1 | OUTPATIENT
Start: 2019-10-23

## 2019-10-23 NOTE — TELEPHONE ENCOUNTER
Pt no longer a Pt here- Pt moved to [place] and has a new PCP.  Pt will contact [facility] again

## 2019-10-23 NOTE — TELEPHONE ENCOUNTER
Pt no longer a Pt here- Pt moved to [place] and has a new PCP.  Pt will contact [facility] again

## 2019-11-04 DIAGNOSIS — I10 ESSENTIAL HYPERTENSION: ICD-10-CM

## 2019-11-04 RX ORDER — DILTIAZEM HYDROCHLORIDE 360 MG/1
CAPSULE, EXTENDED RELEASE ORAL
Qty: 90 CAPSULE | Refills: 1 | OUTPATIENT
Start: 2019-11-04

## 2019-11-04 RX ORDER — PEN NEEDLE, DIABETIC 31 GX3/16"
NEEDLE, DISPOSABLE MISCELLANEOUS
Qty: 100 EACH | Refills: 1 | OUTPATIENT
Start: 2019-11-04

## 2019-11-05 NOTE — TELEPHONE ENCOUNTER
Pt no longer a Pt here- Pt moved to [place] and has a new PCP.  Pt will contact [facility] again

## 2019-11-05 NOTE — TELEPHONE ENCOUNTER
Pt no longer a Pt here- Pt moved to [place] and has a new PCP.  Pt will contact [facility] again

## 2019-11-23 DIAGNOSIS — E78.00 HYPERCHOLESTEROLEMIA: ICD-10-CM

## 2019-11-23 DIAGNOSIS — K21.9 GASTROESOPHAGEAL REFLUX DISEASE WITHOUT ESOPHAGITIS: Primary | ICD-10-CM

## 2019-11-29 NOTE — TELEPHONE ENCOUNTER
RANITIDINE HAS BEEN RECALLED  Last refilled on 8/13/19 and requested patient make an appt which has not been done  No upcoming appt schedule at this time

## 2019-11-29 NOTE — TELEPHONE ENCOUNTER
Branden Mishra  RANITIDINE 300MG CAPSULES          Will file in chart as: RANITIDINE  MG Oral Cap         Sig: TAKE 1 CAPSULE(300 MG) BY MOUTH DAILY    Disp:  90 capsule    Refills:  0    Gastrointestinal Medication Protocol Failed

## 2019-12-02 RX ORDER — RANITIDINE 300 MG/1
CAPSULE ORAL
Qty: 90 CAPSULE | Refills: 0 | OUTPATIENT
Start: 2019-12-02

## 2019-12-02 RX ORDER — FAMOTIDINE 40 MG/1
40 TABLET, FILM COATED ORAL DAILY
Qty: 90 TABLET | Refills: 1 | Status: SHIPPED | OUTPATIENT
Start: 2019-12-02

## 2020-02-13 NOTE — TELEPHONE ENCOUNTER
Requested Prescriptions     Pending Prescriptions Disp Refills   • DROPLET PEN NEEDLES 31G X 5 MM Does not apply Misc [Pharmacy Med Name: DROPLET PEN NEEDLES [id number] 31G X 5 MM  ] 100 each 0     Sig: USE  TO INJECT SUBCUTANEOUSLY  DAILY     LOV 2/7/19 (y

## 2020-02-13 NOTE — TELEPHONE ENCOUNTER
Called patient, [facility] mail order sent it to Dr Marisa José by accident, [facility] is currently in [place], asked to disregard request.

## 2020-02-14 RX ORDER — PEN NEEDLE, DIABETIC 31 GX3/16"
NEEDLE, DISPOSABLE MISCELLANEOUS
Qty: 100 EACH | Refills: 0 | OUTPATIENT
Start: 2020-02-14

## 2020-02-21 NOTE — TELEPHONE ENCOUNTER
Requested Prescriptions     Pending Prescriptions Disp Refills   • DROPLET PEN NEEDLES 31G X 5 MM Does not apply Misc [Pharmacy Med Name: DROPLET PEN NEEDLES [id number] 31G X 5 MM  ] 100 each 0     Sig: USE  TO INJECT SUBCUTANEOUSLY  DAILY     LOV 2/7/19 (y

## 2020-02-24 RX ORDER — PEN NEEDLE, DIABETIC 31 GX3/16"
NEEDLE, DISPOSABLE MISCELLANEOUS
Qty: 100 EACH | Refills: 0 | OUTPATIENT
Start: 2020-02-24

## 2020-03-18 DIAGNOSIS — Z79.4 TYPE 2 DIABETES MELLITUS WITH HYPERGLYCEMIA, WITH LONG-TERM CURRENT USE OF INSULIN (HCC): ICD-10-CM

## 2020-03-18 DIAGNOSIS — E11.65 TYPE 2 DIABETES MELLITUS WITH HYPERGLYCEMIA, WITH LONG-TERM CURRENT USE OF INSULIN (HCC): ICD-10-CM

## 2020-03-18 RX ORDER — INSULIN GLARGINE 100 [IU]/ML
INJECTION, SOLUTION SUBCUTANEOUS
Qty: 30 ML | Refills: 0 | OUTPATIENT
Start: 2020-03-18

## 2021-01-27 DIAGNOSIS — Z23 NEED FOR VACCINATION: ICD-10-CM

## 2021-03-15 ENCOUNTER — TELEPHONE (OUTPATIENT)
Dept: FAMILY MEDICINE CLINIC | Facility: CLINIC | Age: 71
End: 2021-03-15

## 2021-03-15 NOTE — TELEPHONE ENCOUNTER
Looks like there is a note from 10/16/2019 stating that the patient has moved out of state is there any way we can remove  from being her PCP

## 2021-03-24 ENCOUNTER — TELEPHONE (OUTPATIENT)
Dept: FAMILY MEDICINE CLINIC | Facility: CLINIC | Age: 71
End: 2021-03-24

## 2021-07-18 ENCOUNTER — HOSPITAL ENCOUNTER (OUTPATIENT)
Age: 71
Discharge: HOME | End: 2021-07-18
Payer: MEDICARE

## 2021-07-18 VITALS
OXYGEN SATURATION: 96 % | RESPIRATION RATE: 16 BRPM | DIASTOLIC BLOOD PRESSURE: 64 MMHG | HEART RATE: 71 BPM | BODY MASS INDEX: 28 KG/M2 | WEIGHT: 158 LBS | TEMPERATURE: 97 F | SYSTOLIC BLOOD PRESSURE: 159 MMHG

## 2021-07-18 DIAGNOSIS — K04.7 DENTAL ABSCESS: Primary | ICD-10-CM

## 2021-07-18 DIAGNOSIS — I10 ELEVATED BLOOD PRESSURE READING WITH DIAGNOSIS OF HYPERTENSION: ICD-10-CM

## 2021-07-18 PROCEDURE — 99213 OFFICE O/P EST LOW 20 MIN: CPT

## 2021-07-18 RX ORDER — CLINDAMYCIN HYDROCHLORIDE 300 MG/1
300 CAPSULE ORAL 3 TIMES DAILY
Qty: 30 CAPSULE | Refills: 0 | Status: SHIPPED | OUTPATIENT
Start: 2021-07-18 | End: 2021-07-28

## 2021-07-18 NOTE — ED INITIAL ASSESSMENT (HPI)
Patient reports she thinks she has a tooth abscess. Patient reports she has been having severe pain to the tooth that has gone to her face. Patient reports she is on vacation from [place] but is cutting her trip short because of this.

## 2021-07-18 NOTE — ED PROVIDER NOTES
Patient Seen in: [facility]      History   Patient presents with:  Dental Problem    Stated Complaint: tooth abcess x 3 days    HPI/Subjective:   HPI    51-year-old female is a diabetic here with complaint of dental pain that started on T Hyperlipidemia    • IBS (irritable bowel syndrome)    • Indigestion    • Irregular bowel habits    • Itch of skin 2006   • Leaking of urine    • Lichen sclerosus    • Nausea    • Nerve pain    • Osteoarthritis    • Pain in joints    • PONV (postoperative n Smoker      Smokeless tobacco: Never Used    Alcohol use: No      Alcohol/week: 0.0 standard drinks    Drug use: No             Review of Systems    Positive for stated complaint: tooth abcess x 3 days  Other systems are as noted in HPI.   Constitutional an Judgment: Judgment normal.             ED Course                   MDM     Clinical Impression: Dental abscess/elevated blood pressure reading with diagnosis of HTN  Course of Treatment: Push fluids. Soft diet. Avoid any sugary foods.   Take the full co

## 2022-11-09 NOTE — TELEPHONE ENCOUNTER
Passed protocol checks refilled per protocol The medication list included in this document is our record of what you are currently taking, including any changes that were made at today's visit. If you find any differences when compared to your medications at home, or have any questions that were not answered at your visit, please contact the office. We are committed to providing you with the best care possible. In order to help us achieve these goals please remember to bring all medications, herbal products, and over the counter supplements with you to each visit. If your provider has ordered testing for you, please be sure to follow up with our office if you have not received results within 7 days after the testing took place. *If you receive a survey after visiting one of our offices, please take time to share your experience concerning your physician office visit. These surveys are confidential and no health information about you is shared. We are eager to improve for you and we are counting on your feedback to help make that happen. Keep a list of your medicines with you. List all of the prescription medicines, nonprescription medicines, supplements, natural remedies, and vitamins that you take. Tell your healthcare providers who treat you about all of the products you are taking. Your provider can provide you with a form to keep track of them. Just ask. Follow the directions that come with your medicine, including information about food or alcohol. Make sure you know how and when to take your medicine. Do not take more or less than you are supposed to take. Keep all medicines out of the reach of children. Store medicines according to the directions on the label. Monitor yourself. Learn to know how your body reacts to your new medicine and keep track of how it makes you feel before attempting (If your provider has allowed you to do so) to drive or go to work.    Seek emergency medical attention if you think you have used too much of this medicine. An overdose of any prescription medicine can be fatal. Overdose symptoms may include extreme drowsiness, muscle weakness, confusion, cold and clammy skin, pinpoint pupils, shallow breathing, slow heart rate, fainting, or coma. Don't share prescription medicines with others, even when they seem to have the same symptoms. What may be good for you may be harmful to others. If you are no longer taking a prescribed medication and you have pills left please take your pills out of their original containers. Mix crushed pills with an undesirable substance, such as cat litter or used coffee grounds. Put the mixture into a disposable container with a lid, such as an empty margarine tub, or into a sealable bag. Cover up or remove any of your personal information on the empty containers by covering it with black permanent marker or duct tape. Place the sealed container with the mixture, and the empty drug containers, in the trash. If you use a medication that is in the form of a patch, dispose of used patches by folding them in half so that the sticky sides meet, and then flushing them down a toilet. They should not be placed in the household trash where children or pets can find them. If you have any questions, ask your provider or pharmacist for more information. Be sure to keep all appointments for provider visits or tests.

## 2022-12-01 NOTE — TELEPHONE ENCOUNTER
Chief Complaint   Patient presents with   • Knee Pain       HISTORY OF PRESENT ILLNESS:   [place] presents for evaluation of bilateral knee pain, worsening in the last few weeks.  No inciting events noted..      Past Medical History:   Diagnosis Date   • Essential (primary) hypertension    • Obstructive sleep apnea on CPAP        Current Outpatient Medications   Medication Sig   • lisinopril (ZESTRIL) 40 MG tablet Take 1 tablet by mouth daily.   • albuterol 108 (90 Base) MCG/ACT inhaler Inhale 2 puffs into the lungs every 4 hours as needed for Shortness of Breath or Wheezing.     No current facility-administered medications for this visit.       ALLERGIES:  No Known Allergies      Tobacco Use: Never             Vitals:    12/01/22 0806   BP: 139/78   Pulse: 85   SpO2: 100%   Weight: (!) 161.5 kg (356 lb)       Wt Readings from Last 4 Encounters:   12/01/22 (!) 161.5 kg (356 lb)   05/04/22 (!) 161.9 kg (356 lb 14.8 oz)   11/17/20 (!) 160 kg (352 lb 11.2 oz)   10/09/19 (!) 155.7 kg (343 lb 4.8 oz)       Body mass index is 54.13 kg/m².    BMI ASSESSMENT/PLAN:    Patient is obese.    30  minutes of physical activity a day      REVIEW OF SYSTEMS:  Pertinent review of systems above in History of Present Illness.    Health Maintenance Due   Topic Date Due   • Hepatitis B Vaccine (1 of 3 - 3-dose series) Never done   • Hepatitis C Screening  Never done   • COVID-19 Vaccine (3 - Booster for Pfizer series) 10/06/2021       Patient is up to date on flu shot, discussed covid booster, he declines covid booster today.  Labs ordered.    Recent PHQ 2/9 Score    PHQ 2:  Date Adult PHQ 2 Score Adult PHQ 2 Interpretation   12/1/2022 0 No further screening needed       PHQ 9:       DEPRESSION ASSESSMENT/PLAN:  Depression screening is negative no further plan needed.       PHYSICAL EXAM:   GENERAL:  Patient is a 58 year old male who presents in no acute distress.  Patient is well developed, well nourished, alert and oriented to person,  Pt last seen 3/29/18; Lidocain 2% gel ordered. Per pharmacist at [facility], 2% lidocaine gel is not available. Available options are Lidocaine cream, 3% or 4%; OR Lidocaine ointment 5%. Routed to Dr. Lorrie Smith. Please advise. place, and thing.  HEAD:  Normocephalic, atraumatic.  CHEST:  Clear to auscultation bilaterally, no wheeze, no rales, no visible use of accessory muscles.  CARDIOVASCULAR:  Regular rate and rhythm, no murmur, splits, clicks or rubs, no edema.  ABDOMEN:  Round, soft, normoactive bowel sounds, tympanic upon percussion, no hepatosplenomegaly, no costovertebral angle tenderness, no guarding.   MUSCULOSKELETAL: no obvious knee deformity, negative anterior and posterior drawer  NEUROLOGIC:  No focal motor weakness    ASSESSMENT:  Laboratory examination ordered as part of a routine general medical examination  check  - LIPID PANEL WITHOUT REFLEX; Future  - COMPREHENSIVE METABOLIC PANEL; Future  - CBC WITH DIFFERENTIAL; Future  - PSA; Future    Chronic pain of both knees  check  - XR KNEE 3 VW BILATERAL; Future    Primary hypertension  Good control    Chronic pain of right ankle  check  - XR ANKLE 3+ VW RIGHT; Future      PLAN:    Orders Placed This Encounter   • XR Knee 3 View Bilateral   • XR Ankle 3+ View Right   • Lipid Panel Without Reflex   • Comprehensive Metabolic Panel   • CBC with Automated Differential   • PSA   • lisinopril (ZESTRIL) 40 MG tablet       Return in about 1 year (around 12/1/2023) for physical.

## 2023-07-03 NOTE — PROGRESS NOTES
Pt presents for her monthly vitamin B12 injection. 1mL injected into left deltoid. Patient tolerated injection well. Pt presented with no questions or concerns.
MED

## 2025-04-16 NOTE — PROGRESS NOTES
HPI:   Serena Mojica is a 79year old female who presents for upper respiratory symptoms. Sx for 3-4 days. Runny/stuffy nose. Mild HA. No fever. No cough. No ear pain or sore throat. Not taking anything. On Flonase regularly.     Also c/o sores/p EVERY DAY Disp: 90 tablet Rfl: 1   Albuterol Sulfate  (90 Base) MCG/ACT Inhalation Aero Soln Inhale 2 puffs into the lungs every 6 (six) hours as needed for Wheezing.  Disp:  Rfl:    PANTOPRAZOLE SODIUM 40 MG Oral Tab EC TAKE 1 TABLET TWICE DAILY Dis (thirty) days. Per Dr. Pato Lemons - See telephone encounter on 08/12/2016 Disp:  Rfl: 0   triamcinolone acetonide (KENALOG) 0.1 % Apply Externally Cream Apply  topically 2 (two) times daily.  Disp:  Rfl:      REVIEW OF SYSTEMS:   GENERAL: feels well otherwise, FAMILY HISTORY:  No pertinent family history in first degree relatives

## (undated) DEVICE — VIOLET BRAIDED (POLYGLACTIN 910), SYNTHETIC ABSORBABLE SUTURE: Brand: COATED VICRYL

## (undated) DEVICE — GYN CDS: Brand: MEDLINE INDUSTRIES, INC.

## (undated) DEVICE — SKIN AFFIX .4ML

## (undated) DEVICE — #15 STERILE STAINLESS BLADE: Brand: STERILE STAINLESS BLADES

## (undated) DEVICE — SOL  .9 1000ML BTL

## (undated) DEVICE — TUBING CYSTO

## (undated) DEVICE — KENDALL SCD EXPRESS SLEEVES, KNEE LENGTH, MEDIUM: Brand: KENDALL SCD

## (undated) DEVICE — GLOVE SURG SENSICARE SZ 7-1/2

## (undated) NOTE — MR AVS SNAPSHOT
[facility]  [place], [address]  [address] [facility]  [phone]               Thank you for choosing us for your health care visit with EMG 03 NURSE.   We are glad to serve you and happy to provide you with this summary procedure. IF A CHILD is scheduled for this procedure, parents should be advised that they will not be able to accompany the child in the testing room.  Parents will remain in the MRI waiting area and be reunited with the child upon completion of the MR Cevimeline HCl 30 MG Caps   Take 1 capsule (30 mg total) by mouth 3 (three) times daily. Failed pilocarpine 7.5mg TID   Commonly known as:  EVOXAC           Citalopram Hydrobromide 40 MG Tabs   Take 1 tablet (40 mg total) by mouth once daily.    Commonly k TAKE 1 TABLET  AS NEEDED.    Commonly known as:  ZOFRAN           Pantoprazole Sodium 40 MG Tbec   Commonly known as:  PROTONIX           * Pilocarpine HCl 7.5 MG Tabs   Commonly known as:  SALAGEN           * Pilocarpine HCl 7.5 MG Tabs   Take 1 tablet (7.

## (undated) NOTE — Clinical Note
I had the pleasure of seeing Ghassan Trujillo on 2/13/2019. Please see my attached note. Jordan Cummins MD [facility]--Surgery

## (undated) NOTE — MR AVS SNAPSHOT
[address] [facility] [address]  [facility],  [address]  [address] [place] [address] [phone]               Thank you for choosing us for your health care visit with Amaya Escalera MD.  We are glad to serve you and happy to provide you with this dunbar Crestor [Rosuvastatin Calcium]     Elevation liver enzymes    Lactose Intolerance     Metformin     Abdominal cramp    Msg [Monosodium Glutamate]     Other Diarrhea    Raw sugar    Tylenol [Acetaminophen]     Elevated liver enzymes                 Today's * Glucose Blood Strp   Use daily as directed   Commonly known as:  TRUETRACK TEST           * Glucose Blood Strp   TEST ONCE DAILY AS DIRECTED   Commonly known as:  TRUE METRIX BLOOD GLUCOSE TEST           Insulin Glargine 100 UNIT/ML Sopn   Inject 10-30 * TRUE METRIX AIR GLUCOSE METER w/Device Kit           VITAMIN D CAPS 75620 IU OR   Take 1 capsule by mouth once a week. What changed:  Another medication with the same name was removed.  Continue taking this medication, and follow the directions you see chair/bench and a hand held shower head while bathing/showering. BEDROOM:  ? Place light switches within reach of your bed and a night light between the bedroom and bathroom. ? Get up slowly from lying down or sitting if you get dizzy. ?  Keep a working

## (undated) NOTE — Clinical Note
[facility], [address] [facility], [address] ,[address], [place] [address] [address] [facility]        Yocasta Bauer MD  • Choco Bauer MD • Israel Larsen MD • DO Shantanu Goldstein PA-C • CARLEE Brewster

## (undated) NOTE — ED AVS SNAPSHOT
[facility] Emergency Department    [place]  [address]    [place] [address]    Phone:  [phone]    Fax:  [phone]   MRN: [id number]    Department:  [facility] Emergency Department   Date of Visit:  1/2 Medication Information       Follow the directions for taking your medications provided by your doctor.  Please ask your health care provider, pharmacist or nurse if you have any questions regarding your home medications, including potential side effe Expect to receive an electronic request (by e-mail or text) to complete a self-assessment the day after your visit. You may also receive a call from our patient liason soon after your visit.  Also, some patients receive a detailed feedback survey mailed to [address] [phone] [address] ([address] [facility] [id number] [address] ([address]) [address] [place] [address] PATIENT STATED HISTORY:  The patient fell 3 days ago, hit left side of head  and forehead. The patient complaints of headache and had abrustion in left eye. FINDINGS:    Ventricles and sulci are within normal limits.   There is no midline shift or mass appearance of the parotid glands and submandibular glands are unremarkable. MyChart     Visit MyChart  You can access your MyChart to more actively manage your health care and view more details from this visit by going to [email or web address].

## (undated) NOTE — MR AVS SNAPSHOT
[address] [facility] 70  [facility],  [address]  [address] [place] [address] [phone]               Thank you for choosing us for your health care visit with Matthew Jacobson MD.  We are glad to serve you and happy to provide you with this dunbar benzonatate 200 MG Caps   Take 1 capsule (200 mg total) by mouth 3 (three) times daily as needed for cough. Commonly known as:  TESSALON           BuPROPion HCl ER (XL) 300 MG Tb24   Take 1 tablet (300 mg total) by mouth once daily.    Commonly known as: Apply  topically 2 (two) times daily. Commonly known as:  KENALOG           Vitamin C 500 MG Tabs   Take 500 mg by mouth daily. Commonly known as:  VITAMIN C           Vitamin D 1000 units Tabs   Take 1 tablet by mouth daily.                 Where to [facility]

## (undated) NOTE — LETTER
ASTHMA ACTION PLAN for Creta Koyanagi     : 1950     Date: 2018  Provider: Dr. Avinash Rebolledo   Phone for doctor or clinic: [facility] MANAGEMENT  [address] [place] [address]    ACT Score: 25      You can use the col

## (undated) NOTE — MR AVS SNAPSHOT
[facility]  [place], [address]  [address] [facility]  [phone]               Thank you for choosing us for your health care visit with EMG 03 NURSE.   We are glad to serve you and happy to provide you with this summary cyanocobalamin 1000 MCG/ML Soln   Inject 1 mL (1,000 mcg total) into the muscle every 30 (thirty) days.  Per Dr. Clifton Lucia - See telephone encounter on 08/12/2016   Commonly known as:  VITAMIN B12           DilTIAZem HCl ER Beads 360 MG Cp24   TAKE 1 CAPSULE view more details from this visit by going to [email or web address]. If you've recently had a stay at the Hospital you can access your discharge instructions in [employer] by going to Visits < Admission Summaries.  If you've been to the Emergency Depar priority on exercise in your life                    Visit [facility] online at  [email or web address]

## (undated) NOTE — Clinical Note
I had the pleasure of seeing Julio Forman on 3/22/2019. Please see my attached note. Marlee Calderon MD FACSEMG--Surgery

## (undated) NOTE — MR AVS SNAPSHOT
[address] [facility]  [facility] [address]  [address] [facility]  [phone]               Thank you for choosing us for your health care visit with EMG 03 NURSE.   We are glad to serve you and happy to provide you with this summary recent med list.                Alcohol Swabs 70 % Pads   Use 3-6 times daily as directed.            Atorvastatin Calcium 10 MG Tabs   TAKE 1 TABLET BY MOUTH NIGHTLY   Commonly known as:  LIPITOR           BuPROPion HCl ER (XL) 300 MG Tb24   Take 1 tablet Take 1 tablet (10 mg total) by mouth 2 (two) times daily.    Commonly known as:  PRINIVIL,ZESTRIL           mirtazapine 30 MG Tabs   TAKE 1 TABLET BY MOUTH EVERY EVENING   Commonly known as:  REMERON           Omeprazole 40 MG Cpdr   TAKE 1 CAPSULE BY MOUTH Call [phone] for help. Ninuat is NOT to be used for urgent needs. For medical emergencies, dial 911.            Visit [facility] online at  [email or web address]

## (undated) NOTE — LETTER
Keila Muñoz, QKZ:4[birth year]11    CONSENT FOR PROCEDURE/SEDATION    1. I authorize the performance upon Keila Muñoz  the following: Vulvar Biopsy    2.  I authorize Dr. Pearl Herrera MD (and whomever is designated as the doctor’s assistant), to per Witness: _________________________________________ Date:___________     Physician Signature: _______________________________ Date:___________

## (undated) NOTE — IP AVS SNAPSHOT
[facility] [place] [address] [place], [address] ~ [phone]                Discharge Summary   2/27/2017    Deep Alegria           Admission Information        Provider Department    2/27/2017 Monik Pablo DO  3ne-A Citalopram Hydrobromide 40 MG Tabs   Commonly known as:  CeleXA        Take 1 tablet (40 mg total) by mouth once daily.     Faustina Barker                           Clobetasol Propionate 0.05 % Crea   Commonly known as:  TEMOVATE        Use twice daily to Pilocarpine HCl 7.5 MG Tabs   Commonly known as:  SALAGEN        Take 1 tablet (7.5 mg total) by mouth 3 (three) times daily.     Roc Szymanski                           RaNITidine HCl 300 MG Caps   Commonly known as:  ZANTAC        Take 1 capsule (300 mg total Nurse Only with  Kole Salem Velma  ([address])    [facility], [address]  [address] 121            Apr 25, 2017  9:00 AM   FOLLOW UP with Magda Hall MD   RHEUMATOL - If you are a smoker or have smoked in the last 12 months, we encourage you to explore options for quitting.     - If you have concerns related to behavioral health issues or thoughts of harming yourself, contact [facility] a your medications while at home.          Blood Pressure and Cardiac Medications     lisinopril 10 MG Oral Tab    DiltiaZEM HCl ER Beads (TAZTIA XT) 360 MG Oral Capsule SR 24 Hr         Use: Treat abnormal blood pressure (high or low), cardiac conditions; an What to report to your healthcare team: Pain, nausea/vomiting, no bowel movement in 2+ days, diarrhea           Mood and Thought Medications     Citalopram Hydrobromide 40 MG Oral Tab    BuPROPion HCl ER, XL, 300 MG Oral Tablet 24 Hr    Donepezil HCl 10 MG

## (undated) NOTE — MR AVS SNAPSHOT
[address] [facility] 70  [facility],  [address]  [address] [place] [address] [phone]               Thank you for choosing us for your health care visit with Liliane Michel MD.  We are glad to serve you and happy to provide you with this dunbar Increase Lantus 2 units every 3 days if fasting sugar is >120.   Track your blood sugars daily        [facility] SCREENING SCHEDULE   Tests on this list are recommended by your physician but may not be covered, or covered at this frequency, by your No results found for this or any previous visit.  Limited to patients who meet one of the following criteria:   • Men who are 73-68 years old and have smoked more than 100 cigarettes in their lifetime   • Anyone with a family history    Colorectal Cancer Sc Recommend Annually to at least age 76, and as needed after 76 Mammogram,1 Yr due on 02/19/2015 Please get this Mammogram regularly   Immunizations      Influenza  Covered Annually No orders found for this or any previous visit.  Please get every year    Pne Directives. It also has the State forms available on it's website for anyone to review and print using their home computer and printer. (the forms are also available in [address])  [email or web address]  This link also has information from the [employer] DilTIAZem HCl ER Beads 360 MG Cp24   TAKE 1 CAPSULE  ONCE DAILY   Commonly known as:  TIAZAC           Donepezil HCl 10 MG Tabs   TAKE 1 TABLET EVERY EVENING   Commonly known as:  ARICEPT           glimepiride 2 MG Tabs   TAKE 1 TABLET TWICE DAILY   Commo You can get these medications from any pharmacy     Bring a paper prescription for each of these medications    - diazepam 5 MG Tabs            Results of Recent Testing     HEMOGLOBIN A1C      Component Value Standard Range & Units    HEMOGLOBIN A1C 8.2 4 ? Remove all loose wires, cords and throw rugs. ? Keep floors clear of clutter. ? Make sure carpets and area rugs have skid proof backing. ? Do not use slippery wax on bare floors. ? Keep furniture in its accustomed place.    ? If you have pets, be care

## (undated) NOTE — ED AVS SNAPSHOT
[facility] Emergency Department    [place]  [address] [place] [address]    Phone:  [phone]    Fax:  [address]   MRN: [id number]    Department:  [facility] Emergency Department   Date of Visit:  1/2 IF THERE IS ANY CHANGE OR WORSENING OF YOUR CONDITION, CALL YOUR PRIMARY CARE PHYSICIAN AT ONCE OR RETURN IMMEDIATELY TO THE EMERGENCY DEPARTMENT.     If you have been prescribed any medication(s), please fill your prescription right away and begin taking t

## (undated) NOTE — Clinical Note
I had the pleasure of seeing Serena Mojica on 3/6/2019. Please see my attached note. Tameka Donahue MD FACSEMG--Surgery

## (undated) NOTE — LETTER
July 13, 2017    Tracy Christopher 718      Dear Patel Clifton: It was a pleasure speaking with you over the phone recently.  To follow up, I wanted to send you my contact information to utilize when you have a question and

## (undated) NOTE — MR AVS SNAPSHOT
[address] [facility]  [facility] [address]  [address] [facility]  [phone]               Thank you for choosing us for your health care visit with EMG 03 NURSE.   We are glad to serve you and happy to provide you with this summary This list is accurate as of: 3/22/17  9:38 AM.  Always use your most recent med list.                ANTIOXIDANT Tabs   Take 1 tablet by mouth daily.            Atorvastatin Calcium 10 MG Tabs   TAKE 1 TABLET NIGHTLY   Commonly known as:  LIPITOR TAKE 1 CAPSULE(300 MG) BY MOUTH EVERY DAY   Commonly known as:  ZANTAC           triamcinolone acetonide 0.1 % Crea   Apply  topically 2 (two) times daily. Commonly known as:  KENALOG           Vitamin C 500 MG Tabs   Take 500 mg by mouth daily.    Common

## (undated) NOTE — LETTER
Consent to Procedure/Sedation    Date: __6/6/2018_____    Time: ___7:58 AM ___    1. I authorize the performance upon Kaylin Watters the following:  [employer] (UDS)      2.  Arnel Lockhart(and whomever is designated as the [employer] ___________________________    ___________________    Witness: ____________________     Date: ______________    Printed: 2018   7:58 AM    Patient Name: Cody Johnson        : 1950       Medical Record #: [id number]

## (undated) NOTE — Clinical Note
I had the pleasure of seeing Machelle Figueroa on 4/5/2017. Please see my attached note.   Ck Corrales MD FACS EMG--Surgery

## (undated) NOTE — MR AVS SNAPSHOT
[address] [facility] [address]  [facility],  [address]  [address] [facility] [address] [place] [address]               Thank you for choosing us for your health care visit with Nancy Turpin PA-C.   We are glad to serve you and happy to provide you with [facility])    [address] [facility] [phone]              Allergies as of Jan 27, 2017     Biocin [Aspirin-Caffeine]     Clarithromycin     Adverse drug reaction      Crestor [Rosuvastatin Calcium]     Elevation liver en Take 1 tablet (2 mg total) by mouth 2 (two) times daily.    Commonly known as:  AMARYL           * Glucose Blood Strp   Use daily as directed   Commonly known as:  TRUETRACK TEST           * Glucose Blood Strp   TEST ONCE DAILY AS DIRECTED   Commonly known Take 1 capsule by mouth once a week. * Notice: This list has 8 medication(s) that are the same as other medications prescribed for you. Read the directions carefully, and ask your doctor or other care provider to review them with you.

## (undated) NOTE — Clinical Note
I had the pleasure of seeing Machelle Figueroa on 2/20/2019. Please see my attached note. Ck Corrales MD FACSEMG--Surgery

## (undated) NOTE — Clinical Note
I had the pleasure of seeing Irene Dash on 4/3/2019. Please see my attached note. Alaina Yancey MD FACSEMG--Surgery

## (undated) NOTE — MR AVS SNAPSHOT
[address] [facility] 70  [facility],  [address]  [address] [place] [address] [phone]               Thank you for choosing us for your health care visit with Khushbu Doll MD.  We are glad to serve you and happy to provide you with this dunbar Normal Orders This Visit    SURGERY - INTERNAL [[id number] CUSTOM]  Order #:  [id number]         **REFERRAL REQUEST**    Your physician has referred you to a specialist.  Your physician or the clinic staff will provide you with the phone number you should c Commonly known as:  WELLBUTRIN XL           Citalopram Hydrobromide 40 MG Tabs   Take 1 tablet (40 mg total) by mouth once daily.    Commonly known as:  CeleXA           Clobetasol Propionate 0.05 % Crea   Use twice daily to affected area   Commonly known a Take 1 tablet by mouth daily. MyChart     Visit [employer]  You can access your MyChart to more actively manage your health care and view more details from this visit by going to [email or web address].   If you've recently had a stay a